# Patient Record
Sex: FEMALE | Race: WHITE | Employment: UNEMPLOYED | URBAN - METROPOLITAN AREA
[De-identification: names, ages, dates, MRNs, and addresses within clinical notes are randomized per-mention and may not be internally consistent; named-entity substitution may affect disease eponyms.]

---

## 2019-09-24 RX ORDER — BUPROPION HYDROCHLORIDE 200 MG/1
TABLET, EXTENDED RELEASE ORAL
Status: ON HOLD | COMMUNITY
Start: 2019-02-11 | End: 2020-08-11

## 2019-09-24 RX ORDER — NITROFURANTOIN 25; 75 MG/1; MG/1
CAPSULE ORAL
COMMUNITY
Start: 2019-04-02 | End: 2019-09-25 | Stop reason: ALTCHOICE

## 2020-08-11 ENCOUNTER — ANESTHESIA EVENT (OUTPATIENT)
Dept: LABOR AND DELIVERY | Age: 30
End: 2020-08-11
Payer: COMMERCIAL

## 2020-08-11 ENCOUNTER — ANESTHESIA (OUTPATIENT)
Dept: LABOR AND DELIVERY | Age: 30
End: 2020-08-11
Payer: COMMERCIAL

## 2020-08-11 ENCOUNTER — HOSPITAL ENCOUNTER (INPATIENT)
Age: 30
LOS: 2 days | Discharge: HOME | End: 2020-08-13
Attending: OBSTETRICS & GYNECOLOGY | Admitting: OBSTETRICS & GYNECOLOGY
Payer: COMMERCIAL

## 2020-08-11 LAB
ABO/RH: NORMAL
AMNISURE, POC: POSITIVE
AMPHETAMINE SCREEN, URINE: NOT DETECTED
ANTIBODY SCREEN: NORMAL
BARBITURATE SCREEN URINE: NOT DETECTED
BENZODIAZEPINE SCREEN, URINE: NOT DETECTED
CANNABINOID SCREEN URINE: NOT DETECTED
COCAINE METABOLITE SCREEN URINE: NOT DETECTED
FENTANYL SCREEN, URINE: NOT DETECTED
HCT VFR BLD CALC: 37.3 % (ref 34–48)
HEMOGLOBIN: 13.1 G/DL (ref 11.5–15.5)
Lab: ABNORMAL
Lab: NORMAL
MCH RBC QN AUTO: 32.6 PG (ref 26–35)
MCHC RBC AUTO-ENTMCNC: 35.1 % (ref 32–34.5)
MCV RBC AUTO: 92.8 FL (ref 80–99.9)
METHADONE SCREEN, URINE: NOT DETECTED
NEGATIVE QC PASS/FAIL: ABNORMAL
OPIATE SCREEN URINE: NOT DETECTED
OXYCODONE URINE: NOT DETECTED
PDW BLD-RTO: 11.9 FL (ref 11.5–15)
PHENCYCLIDINE SCREEN URINE: NOT DETECTED
PLATELET # BLD: 204 E9/L (ref 130–450)
PMV BLD AUTO: 10.6 FL (ref 7–12)
POSITIVE QC PASS/FAIL: ABNORMAL
RBC # BLD: 4.02 E12/L (ref 3.5–5.5)
WBC # BLD: 8.2 E9/L (ref 4.5–11.5)

## 2020-08-11 PROCEDURE — 6370000000 HC RX 637 (ALT 250 FOR IP): Performed by: OBSTETRICS & GYNECOLOGY

## 2020-08-11 PROCEDURE — 36415 COLL VENOUS BLD VENIPUNCTURE: CPT

## 2020-08-11 PROCEDURE — 2500000003 HC RX 250 WO HCPCS: Performed by: ANESTHESIOLOGY

## 2020-08-11 PROCEDURE — 51701 INSERT BLADDER CATHETER: CPT

## 2020-08-11 PROCEDURE — 85027 COMPLETE CBC AUTOMATED: CPT

## 2020-08-11 PROCEDURE — 86900 BLOOD TYPING SEROLOGIC ABO: CPT

## 2020-08-11 PROCEDURE — 6360000002 HC RX W HCPCS: Performed by: OBSTETRICS & GYNECOLOGY

## 2020-08-11 PROCEDURE — 86901 BLOOD TYPING SEROLOGIC RH(D): CPT

## 2020-08-11 PROCEDURE — 2500000003 HC RX 250 WO HCPCS: Performed by: NURSE ANESTHETIST, CERTIFIED REGISTERED

## 2020-08-11 PROCEDURE — 7200000001 HC VAGINAL DELIVERY

## 2020-08-11 PROCEDURE — 2580000003 HC RX 258: Performed by: MIDWIFE

## 2020-08-11 PROCEDURE — 0UQGXZZ REPAIR VAGINA, EXTERNAL APPROACH: ICD-10-PCS | Performed by: OBSTETRICS & GYNECOLOGY

## 2020-08-11 PROCEDURE — 6360000002 HC RX W HCPCS: Performed by: MIDWIFE

## 2020-08-11 PROCEDURE — 1220000000 HC SEMI PRIVATE OB R&B

## 2020-08-11 PROCEDURE — 86850 RBC ANTIBODY SCREEN: CPT

## 2020-08-11 PROCEDURE — 80307 DRUG TEST PRSMV CHEM ANLYZR: CPT

## 2020-08-11 PROCEDURE — 99233 SBSQ HOSP IP/OBS HIGH 50: CPT | Performed by: MIDWIFE

## 2020-08-11 PROCEDURE — 3700000025 EPIDURAL BLOCK: Performed by: ANESTHESIOLOGY

## 2020-08-11 RX ORDER — ONDANSETRON 2 MG/ML
4 INJECTION INTRAMUSCULAR; INTRAVENOUS EVERY 6 HOURS PRN
Status: DISCONTINUED | OUTPATIENT
Start: 2020-08-11 | End: 2020-08-11 | Stop reason: HOSPADM

## 2020-08-11 RX ORDER — NALBUPHINE HCL 10 MG/ML
5 AMPUL (ML) INJECTION EVERY 4 HOURS PRN
Status: DISCONTINUED | OUTPATIENT
Start: 2020-08-11 | End: 2020-08-11 | Stop reason: HOSPADM

## 2020-08-11 RX ORDER — FERROUS SULFATE 325(65) MG
325 TABLET ORAL 2 TIMES DAILY WITH MEALS
Status: DISCONTINUED | OUTPATIENT
Start: 2020-08-11 | End: 2020-08-13 | Stop reason: HOSPADM

## 2020-08-11 RX ORDER — ACETAMINOPHEN 650 MG
TABLET, EXTENDED RELEASE ORAL
Status: DISPENSED
Start: 2020-08-11 | End: 2020-08-11

## 2020-08-11 RX ORDER — SODIUM CHLORIDE 0.9 % (FLUSH) 0.9 %
10 SYRINGE (ML) INJECTION EVERY 12 HOURS SCHEDULED
Status: DISCONTINUED | OUTPATIENT
Start: 2020-08-11 | End: 2020-08-13 | Stop reason: HOSPADM

## 2020-08-11 RX ORDER — SODIUM CHLORIDE 0.9 % (FLUSH) 0.9 %
10 SYRINGE (ML) INJECTION PRN
Status: DISCONTINUED | OUTPATIENT
Start: 2020-08-11 | End: 2020-08-13 | Stop reason: HOSPADM

## 2020-08-11 RX ORDER — IBUPROFEN 800 MG/1
800 TABLET ORAL EVERY 8 HOURS PRN
Status: DISCONTINUED | OUTPATIENT
Start: 2020-08-11 | End: 2020-08-13 | Stop reason: HOSPADM

## 2020-08-11 RX ORDER — ACETAMINOPHEN 325 MG/1
650 TABLET ORAL EVERY 4 HOURS PRN
Status: DISCONTINUED | OUTPATIENT
Start: 2020-08-11 | End: 2020-08-13 | Stop reason: HOSPADM

## 2020-08-11 RX ORDER — SODIUM CHLORIDE, SODIUM LACTATE, POTASSIUM CHLORIDE, CALCIUM CHLORIDE 600; 310; 30; 20 MG/100ML; MG/100ML; MG/100ML; MG/100ML
INJECTION, SOLUTION INTRAVENOUS CONTINUOUS
Status: DISCONTINUED | OUTPATIENT
Start: 2020-08-11 | End: 2020-08-13 | Stop reason: HOSPADM

## 2020-08-11 RX ORDER — NALOXONE HYDROCHLORIDE 0.4 MG/ML
0.4 INJECTION, SOLUTION INTRAMUSCULAR; INTRAVENOUS; SUBCUTANEOUS PRN
Status: DISCONTINUED | OUTPATIENT
Start: 2020-08-11 | End: 2020-08-11 | Stop reason: HOSPADM

## 2020-08-11 RX ORDER — DOCUSATE SODIUM 100 MG/1
100 CAPSULE, LIQUID FILLED ORAL 2 TIMES DAILY
Status: DISCONTINUED | OUTPATIENT
Start: 2020-08-11 | End: 2020-08-13 | Stop reason: HOSPADM

## 2020-08-11 RX ORDER — LIDOCAINE HYDROCHLORIDE 10 MG/ML
INJECTION, SOLUTION INFILTRATION; PERINEURAL
Status: DISCONTINUED
Start: 2020-08-11 | End: 2020-08-11 | Stop reason: WASHOUT

## 2020-08-11 RX ORDER — MODIFIED LANOLIN
OINTMENT (GRAM) TOPICAL PRN
Status: DISCONTINUED | OUTPATIENT
Start: 2020-08-11 | End: 2020-08-13 | Stop reason: HOSPADM

## 2020-08-11 RX ADMIN — Medication 8 ML: at 05:58

## 2020-08-11 RX ADMIN — Medication 999 MILLI-UNITS/MIN: at 16:04

## 2020-08-11 RX ADMIN — Medication 1 MILLI-UNITS/MIN: at 13:34

## 2020-08-11 RX ADMIN — SODIUM CHLORIDE, POTASSIUM CHLORIDE, SODIUM LACTATE AND CALCIUM CHLORIDE: 600; 310; 30; 20 INJECTION, SOLUTION INTRAVENOUS at 06:39

## 2020-08-11 RX ADMIN — BENZOCAINE AND LEVOMENTHOL: 200; 5 SPRAY TOPICAL at 20:22

## 2020-08-11 RX ADMIN — DOCUSATE SODIUM 100 MG: 100 CAPSULE, LIQUID FILLED ORAL at 20:22

## 2020-08-11 RX ADMIN — Medication 15 ML/HR: at 05:59

## 2020-08-11 RX ADMIN — Medication: at 20:22

## 2020-08-11 RX ADMIN — IBUPROFEN 800 MG: 800 TABLET, FILM COATED ORAL at 20:22

## 2020-08-11 SDOH — HEALTH STABILITY: MENTAL HEALTH: HOW OFTEN DO YOU HAVE A DRINK CONTAINING ALCOHOL?: NEVER

## 2020-08-11 ASSESSMENT — PAIN SCALES - GENERAL: PAINLEVEL_OUTOF10: 3

## 2020-08-11 NOTE — PROCEDURES
Vaginal Delivery Note    Details of Procedure: The patient is a 31 y.o. female at 39w6d   OB History        1    Para        Term                AB        Living           SAB        TAB        Ectopic        Molar        Multiple        Live Births                 who was admitted for active phase labor. She received the following interventions: none She was known to be GBS negative and did not receive antibiotic prophylaxis. The patient progressed well,did receive an epidural, became complete and started to push. After pushing for 3 hours the fetal head was at the perineum, nose and mouth suctioned with bulb suction and the rest of the infant delivered atraumatically, placed on mother abdomen. Cord was clamped and cut and infant handed off to the waiting nurse for evaluation. The delivery of the placenta was spontaneous. The perineum and vagina were explored and a 1cm vaginal laceration was repaired.    Male Infant, Weight pending gms, APGARS 8 and 9    Anesthesia:  epidural anesthesia    Estimated blood loss:  300ml    Specimen:  Placenta not sent to pathology     Cord blood sent Yes    Complications:  none    Condition:  mother and infant stable in delivery room    Jayme Meckel, MD  OBGYN

## 2020-08-11 NOTE — ANESTHESIA PROCEDURE NOTES
Epidural Block    Patient location during procedure: OB  Reason for block: labor epidural  Staffing  Anesthesiologist: Nita Ferrer DO  Resident/CRNA: Gita Katz APRN - CRNA  Performed: resident/CRNA   Preanesthetic Checklist  Completed: patient identified, site marked, surgical consent, pre-op evaluation, timeout performed, IV checked, risks and benefits discussed, monitors and equipment checked, anesthesia consent given, oxygen available and patient being monitored  Epidural  Patient position: sitting  Prep: ChloraPrep  Patient monitoring: cardiac monitor, continuous pulse ox and frequent blood pressure checks  Approach: midline  Location: lumbar (1-5)  Injection technique: KARRI saline  Provider prep: mask and sterile gloves  Needle  Needle type: Tuohy   Needle gauge: 18 G  Needle length: 3.5 in  Needle insertion depth: 6 cm  Catheter type: end hole  Catheter size: 20 G.   Catheter at skin depth: 14 cm  Test dose: negative  Assessment  Hemodynamics: stable  Attempts: 1

## 2020-08-11 NOTE — ANESTHESIA PRE PROCEDURE
Department of Anesthesiology  Preprocedure Note       Name:  Lauren Monreal   Age:  31 y.o.  :  1990                                          MRN:  [id number]         Date:  2020      Surgeon: * No surgeons listed *    Procedure: * No procedures listed *    Medications prior to admission:   Prior to Admission medications    Medication Sig Start Date End Date Taking? Authorizing Provider   Prenatal Vit-Fe Fumarate-FA (PRENATAL 19 PO) Take by mouth   Yes Historical Provider, MD       Current medications:    Current Facility-Administered Medications   Medication Dose Route Frequency Provider Last Rate Last Dose    povidone-iodine (BETADINE) 10 % external solution             lidocaine 1 % injection             lactated ringers infusion   Intravenous Continuous You Medal, APRN - CNM        oxytocin (PITOCIN) 30 units in 500 mL infusion  1 stephanie-units/min Intravenous Continuous PRN You Medal, APRN - CNM        naloxone [facility]) injection 0.4 mg  0.4 mg Intravenous PRN Elder Isabella, DO        nalbuphine (NUBAIN) injection 5 mg  5 mg Intravenous Q4H PRN Elder Isabella, DO        ondansetron [facility]) injection 4 mg  4 mg Intravenous Q6H PRN Elder Isabella, DO        fentaNYL 1.85mcg/ml and Bupivicaine 0.1% in 0.9% NS 135ml infusion (OB) epidural  15 mL/hr Epidural Continuous Elder Isabella, DO           Allergies: Allergies   Allergen Reactions    Loracarbef        Problem List:    Patient Active Problem List   Diagnosis Code    Anxiety F41.9    Uterine contractions during pregnancy O62.2       Past Medical History:        Diagnosis Date    Anxiety        Past Surgical History:  History reviewed. No pertinent surgical history.     Social History:    Social History     Tobacco Use    Smoking status: Never Smoker    Smokeless tobacco: Never Used   Substance Use Topics    Alcohol use: Never     Frequency: Never                                Counseling given: Not Answered      Vital Signs (Current):   Vitals:    08/11/20 0441 08/11/20 0445   BP: (!) 140/91 (!) 140/91   Pulse: 83 83   Resp: 16    Temp: 36.9 °C (98.4 °F)    TempSrc: Oral    Weight: 150 lb (68 kg)    Height: 5' 4\" (1.626 m)                                               BP Readings from Last 3 Encounters:   08/11/20 (!) 140/91       NPO Status: Time of last liquid consumption: 2330                        Time of last solid consumption: 2330                        Date of last liquid consumption: 08/10/20                        Date of last solid food consumption: 08/10/20    BMI:   Wt Readings from Last 3 Encounters:   08/11/20 150 lb (68 kg)     Body mass index is 25.75 kg/m². CBC:   Lab Results   Component Value Date    WBC 8.2 08/11/2020    RBC 4.02 08/11/2020    HGB 13.1 08/11/2020    HCT 37.3 08/11/2020    MCV 92.8 08/11/2020    RDW 11.9 08/11/2020     08/11/2020       CMP: No results found for: NA, K, CL, CO2, BUN, CREATININE, GFRAA, AGRATIO, LABGLOM, GLUCOSE, PROT, CALCIUM, BILITOT, ALKPHOS, AST, ALT    POC Tests: No results for input(s): POCGLU, POCNA, POCK, POCCL, POCBUN, POCHEMO, POCHCT in the last 72 hours.     Coags: No results found for: PROTIME, INR, APTT    HCG (If Applicable): No results found for: PREGTESTUR, PREGSERUM, HCG, HCGQUANT     ABGs: No results found for: PHART, PO2ART, RSU6HKH, MME3FJR, BEART, G3GZMTYP     Type & Screen (If Applicable):  No results found for: LABABO, LABRH    Drug/Infectious Status (If Applicable):  No results found for: HIV, HEPCAB    COVID-19 Screening (If Applicable): No results found for: COVID19      Anesthesia Evaluation   no history of anesthetic complications:   Airway: Mallampati: II  TM distance: >3 FB   Neck ROM: full  Mouth opening: > = 3 FB Dental: normal exam         Pulmonary:Negative Pulmonary ROS and normal exam  breath sounds clear to auscultation                             Cardiovascular:Negative CV ROS            Rhythm: regular  Rate: normal                    Neuro/Psych:   (+) depression/anxiety             GI/Hepatic/Renal: Neg GI/Hepatic/Renal ROS            Endo/Other: Negative Endo/Other ROS                    Abdominal:           Vascular: negative vascular ROS. Anesthesia Plan      epidural     ASA 2             Anesthetic plan and risks discussed with patient.                       Jay Antonio APRN - CRNA   8/11/2020

## 2020-08-11 NOTE — FLOWSHEET NOTE
Patient helped to bathroom. Unable to void. Nina care given and explained. Ice cont. To perineum. Assisted back to bed. Tolerated ambulating well.

## 2020-08-11 NOTE — H&P
Department of Obstetrics and Gynecology  Nurse Practitioner Obstetrics History and Physical        CHIEF COMPLAINT:  contractions    HISTORY OF PRESENT ILLNESS:  Dez Shine is a 31 y.o. female , No LMP recorded. Patient is pregnant. ,  at Unknown. Presents to L&D with contractions that started around 5pm last evening getting stronger and closer. Had sudden gush of clear fluid around 4am.  Denies bleeding, reports good FM. Pregnancy uncomplicated. GBS negative      OB History        1    Para        Term                AB        Living           SAB        TAB        Ectopic        Molar        Multiple        Live Births                    Estimated Due Date: Estimated Date of Delivery: None noted. Pregnancy complicated by:   Patient Active Problem List   Diagnosis Code    Anxiety F41.9    Uterine contractions during pregnancy O62.2           PAST OB HISTORY  OB History        1    Para        Term                AB        Living           SAB        TAB        Ectopic        Molar        Multiple        Live Births                      Past Medical History:          Diagnosis Date    Anxiety        Past Surgical History:      History reviewed. No pertinent surgical history. Social History:    TOBACCO:   reports that she has never smoked. She has never used smokeless tobacco.  ETOH:   reports no history of alcohol use. DRUGS:   reports no history of drug use.   Family History:       Problem Relation Age of Onset    Anxiety Disorder Mother         Endometriosis    Coronary Art Dis Father     Hypertension Brother     High Cholesterol Brother        Medications Prior to Admission:  Medications Prior to Admission: Prenatal Vit-Fe Fumarate-FA (PRENATAL 19 PO), Take by mouth  [DISCONTINUED] Norgestim-Eth Estrad Triphasic 0.18/0.215/0.25 MG-35 MCG TABS, Take 1 tablet by mouth daily  [DISCONTINUED] buPROPion (WELLBUTRIN SR) 200 MG extended release tablet, Take by mouth    Allergies:  Loracarbef      REVIEW OF SYSTEMS:          CONSTITUTIONAL :      No fever, no chills   HEENT :                         Headache absent,   visual disturbances absent  CARDIOVASCULAR :    No chest pain, no palpitations, no edema   RESPIRATORY :            No pain, no shortness of breath   GASTROINTESTINAL : No N/V, no D/C,    abdominal pain absent   GENITOURINARY :      Dysuria   absent,   hematuria absent,   urinary frequency absent  MUSCULOSKELETAL:  No myalgia,   back pain absent  NEUROLOGICAL :        No migraine, no seizures. Pertinent positives and negatives addressed in HPI, other systems reviewed and negative      PHYSICAL EXAM:    BP (!) 140/91   Pulse 83   Temp 98.4 °F (36.9 °C) (Oral)   Resp 16   Ht 5' 4\" (1.626 m)   Wt 150 lb (68 kg)   BMI 25.75 kg/m²     General appearance:  awake, alert, cooperative, no apparent distress, and appears stated age  Neurologic:  Awake, alert, oriented to name, place and time.   Ambulatory to unit      Lungs:  Respirations easy and unlabored    Abdomen:   soft, gravid, non-tender,   CVA tenderness NA   Fetal position vertex by Leopold's   EFW AGA  Fetal heart rate:  Baseline Heart Rate 135   Variability moderate   Accelerations Present   Decelerations absent  Pelvis:  External Genitalia: Lesions absent  SSE:  NA  Cervix:    DILATION:  5 cm  EFFACEMENT:  100%  STATION:  -2    Contractions:  regular, every 1-2 minutes  Membranes:  Date/time: 2020 at 0400, Amount: gush and Color: clear      GBS: negative      Impression:  31 y.o.  at 40.5 weeks gestation, active labor, GBS negative, category I tracing    Discussed with  Dr. Walker Murphy     Plan:  Admit, routine labor orders, may have epidural.          Electronically signed by SANDIE Fox CNM on 2020 at 5:22 AM

## 2020-08-11 NOTE — PROGRESS NOTES
Dr. Lauren Chapa notified patient still pushing at this time.  Doctor would like patient to go in knee chest for 30 minutes and pitocin to be started

## 2020-08-12 LAB
HCT VFR BLD CALC: 29.5 % (ref 34–48)
HEMOGLOBIN: 10.3 G/DL (ref 11.5–15.5)

## 2020-08-12 PROCEDURE — 85018 HEMOGLOBIN: CPT

## 2020-08-12 PROCEDURE — 51701 INSERT BLADDER CATHETER: CPT

## 2020-08-12 PROCEDURE — 36415 COLL VENOUS BLD VENIPUNCTURE: CPT

## 2020-08-12 PROCEDURE — 1220000000 HC SEMI PRIVATE OB R&B

## 2020-08-12 PROCEDURE — 6370000000 HC RX 637 (ALT 250 FOR IP): Performed by: OBSTETRICS & GYNECOLOGY

## 2020-08-12 PROCEDURE — 85014 HEMATOCRIT: CPT

## 2020-08-12 PROCEDURE — 51798 US URINE CAPACITY MEASURE: CPT

## 2020-08-12 RX ADMIN — IBUPROFEN 800 MG: 800 TABLET, FILM COATED ORAL at 19:59

## 2020-08-12 RX ADMIN — DOCUSATE SODIUM 100 MG: 100 CAPSULE, LIQUID FILLED ORAL at 19:59

## 2020-08-12 RX ADMIN — DOCUSATE SODIUM 100 MG: 100 CAPSULE, LIQUID FILLED ORAL at 10:34

## 2020-08-12 RX ADMIN — IBUPROFEN 800 MG: 800 TABLET, FILM COATED ORAL at 04:35

## 2020-08-12 RX ADMIN — Medication: at 10:34

## 2020-08-12 ASSESSMENT — PAIN SCALES - GENERAL
PAINLEVEL_OUTOF10: 3
PAINLEVEL_OUTOF10: 1

## 2020-08-12 NOTE — PROGRESS NOTES
Darden catheter discontinued without difficulty. DTV [id number]. Vaginal bleeding small amount. Aware of molly care. Not wanting to use ice.

## 2020-08-12 NOTE — ANESTHESIA POSTPROCEDURE EVALUATION
Department of Anesthesiology  Postprocedure Note    Patient: Dez Shine  MRN: [id number]  YOB: 1990  Date of evaluation: 8/12/2020  Time:  8:50 AM     Procedure Summary     Date:  08/11/20 Room / Location:      Anesthesia Start:  0541 Anesthesia Stop:  0532    Procedure:  Labor Analgesia Diagnosis:      Scheduled Providers:   Responsible Provider:  Breanna Vides DO    Anesthesia Type:  epidural ASA Status:  2          Anesthesia Type: epidural    Joycelyn Phase I:      Joycelyn Phase II:      Last vitals: Reviewed and per EMR flowsheets.        Anesthesia Post Evaluation    Patient location during evaluation: bedside  Patient participation: complete - patient participated  Level of consciousness: awake and alert  Pain score: 0  Airway patency: patent  Nausea & Vomiting: no nausea and no vomiting  Complications: no  Cardiovascular status: hemodynamically stable  Respiratory status: acceptable and room air  Hydration status: euvolemic

## 2020-08-12 NOTE — PROGRESS NOTES
Post Partum Vaginal Delivery Progress Note    PPD# 1 s/p     SUBJECTIVE:  No complaints. Couldn't urinate after delivery - valiente placed this morning.       Vitals:  Vitals:    20 1919 20 2324 20 0624 20 0711   BP: (!) 140/81 131/75 122/79 127/81   Pulse: 79 84 69 80   Resp: 18 18 16 16   Temp: 98.5 °F (36.9 °C) 97.4 °F (36.3 °C) 98.2 °F (36.8 °C) 98.6 °F (37 °C)   TempSrc: Oral Oral Oral Oral   SpO2:       Weight:       Height:           Exam:  Fundus firm, non-tender, normal lochia  Extremities non-tender    Labs:   Lab Results   Component Value Date    WBC 8.2 2020    HGB 10.3 (L) 2020    HCT 29.5 (L) 2020    MCV 92.8 2020     2020       ASSESSMENT & PLAN:  PPD # 1  Patient Active Problem List   Diagnosis    Anxiety    Uterine contractions during pregnancy     -Continue routine postpartum care  -D/c valiente this afternoon to see if pt can void  -Postpartum Hgb 10.3  -Cont inpt management     Trevor Curtis MD  OBGYN

## 2020-08-12 NOTE — PROGRESS NOTES
Patient up to bathroom with assistance. Unable to void. Patient straight cathed 1450ml. Patient tolerated well. Bleeding small amount. fundas firm midline U-U. DTV [id number].

## 2020-08-12 NOTE — PROGRESS NOTES
Hearing screening results were discussed with parent. Questions answered. Brochure given to parent. Advised to monitor developmental milestones and contact physician for any concerns.    Wendy Ellis

## 2020-08-12 NOTE — LACTATION NOTE
Mom reports baby has latched well a couple times, sleepy now skin to skin. Encouraged skin to skin and frequent attempts at breast to stimulate milk production. Instructed on normal infant behavior in the first 12-24 hours and importance of stimulating the baby frequently to eat during this time. Reviewed hand expression, and encouraged to hand express drops of colostrum when baby is sleepy. Instructed that baby may also feed 8-12 times a day- cluster feeding at times- as her milk supply is being established. Instructed on benefits of skin to skin, rooming-in and avoidance of pacifier use until breastfeeding is well established. Educated on making sure infant has an open airway while breastfeeding and skin to skin. Instructed on hunger cues and waking techniques to try. Reviewed signs of adequate I & O; allow baby to feed ad temo and not to limit time at breast. Information given regarding health benefits of colostrum and exclusive breastfeeding. Encouraged to call with any concerns. Mom has a breast pump for home use. Lactation resources provided.

## 2020-08-13 VITALS
BODY MASS INDEX: 25.61 KG/M2 | DIASTOLIC BLOOD PRESSURE: 77 MMHG | HEART RATE: 69 BPM | OXYGEN SATURATION: 100 % | RESPIRATION RATE: 16 BRPM | SYSTOLIC BLOOD PRESSURE: 115 MMHG | WEIGHT: 150 LBS | TEMPERATURE: 97.6 F | HEIGHT: 64 IN

## 2020-08-13 PROCEDURE — 6370000000 HC RX 637 (ALT 250 FOR IP): Performed by: OBSTETRICS & GYNECOLOGY

## 2020-08-13 RX ORDER — NITROFURANTOIN 25; 75 MG/1; MG/1
100 CAPSULE ORAL DAILY
Qty: 10 CAPSULE | Refills: 0 | Status: SHIPPED | OUTPATIENT
Start: 2020-08-13 | End: 2020-08-23

## 2020-08-13 RX ORDER — IBUPROFEN 800 MG/1
800 TABLET ORAL EVERY 8 HOURS PRN
Qty: 24 TABLET | Refills: 0 | Status: SHIPPED | OUTPATIENT
Start: 2020-08-13 | End: 2022-03-07

## 2020-08-13 RX ADMIN — Medication: at 09:41

## 2020-08-13 RX ADMIN — IBUPROFEN 800 MG: 800 TABLET, FILM COATED ORAL at 09:41

## 2020-08-13 RX ADMIN — DOCUSATE SODIUM 100 MG: 100 CAPSULE, LIQUID FILLED ORAL at 09:40

## 2020-08-13 ASSESSMENT — PAIN DESCRIPTION - FREQUENCY
FREQUENCY: INTERMITTENT
FREQUENCY: INTERMITTENT

## 2020-08-13 ASSESSMENT — PAIN SCALES - GENERAL
PAINLEVEL_OUTOF10: 1
PAINLEVEL_OUTOF10: 3

## 2020-08-13 ASSESSMENT — PAIN - FUNCTIONAL ASSESSMENT
PAIN_FUNCTIONAL_ASSESSMENT: ACTIVITIES ARE NOT PREVENTED
PAIN_FUNCTIONAL_ASSESSMENT: ACTIVITIES ARE NOT PREVENTED

## 2020-08-13 ASSESSMENT — PAIN DESCRIPTION - ORIENTATION
ORIENTATION: LOWER
ORIENTATION: LOWER

## 2020-08-13 ASSESSMENT — PAIN DESCRIPTION - DESCRIPTORS
DESCRIPTORS: CRAMPING
DESCRIPTORS: CRAMPING

## 2020-08-13 ASSESSMENT — PAIN DESCRIPTION - LOCATION
LOCATION: ABDOMEN
LOCATION: ABDOMEN

## 2020-08-13 ASSESSMENT — PAIN DESCRIPTION - PROGRESSION
CLINICAL_PROGRESSION: GRADUALLY IMPROVING
CLINICAL_PROGRESSION: NOT CHANGED

## 2020-08-13 ASSESSMENT — PAIN DESCRIPTION - PAIN TYPE
TYPE: ACUTE PAIN
TYPE: ACUTE PAIN

## 2020-08-13 NOTE — PROGRESS NOTES
Discharge teaching done. Instructions given. Mom verbalizes understanding. TN'Z filled and delivered by in house pharmacy. Mom ready for discharge.

## 2020-08-13 NOTE — DISCHARGE SUMMARY
Obstetrical Discharge Form    Gestational Age:40w5d    Antepartum complications: none    Date of Delivery: 2020     Type of Delivery: vaginal, spontaneous    Delivered By:  Elsy Hatfield MD         Baby:   Information for the patient's :  Trupti,  [facility] [[id number]]        Anesthesia: Epidural    Intrapartum complications: None      Postpartum complications: inability for spontaneous urination    Discharge Date: 2020    Condition at Discharge: stable  Disposition: home    Plan:   Follow up in 4 day(s)

## 2020-08-13 NOTE — PROGRESS NOTES
Post Partum Vaginal Delivery Progress Note    PPD# 2 s/p     SUBJECTIVE:  Still couldn't urinate overnight therefore catheter was placed again.        Vitals:  Vitals:    20 0711 20 1222 20 1946 20 0857   BP: 127/81 125/80 133/89 115/77   Pulse: 80 82 88 69   Resp: 16 18 16 16   Temp: 98.6 °F (37 °C) 98.7 °F (37.1 °C) 97.8 °F (36.6 °C) 97.6 °F (36.4 °C)   TempSrc: Oral Oral Oral Oral   SpO2:       Weight:       Height:           Exam:  Fundus firm, non-tender, normal lochia  Extremities non-tender    Labs:   Lab Results   Component Value Date    WBC 8.2 2020    HGB 10.3 (L) 2020    HCT 29.5 (L) 2020    MCV 92.8 2020     2020       ASSESSMENT & PLAN:  PPD # 2  Patient Active Problem List   Diagnosis    Anxiety    Uterine contractions during pregnancy     -Continue routine postpartum care  -Postpartum Hgb 10.3  -Will d/c home today with follow up Monday to remove catheter  -Home with valiente catheter, will RX ABX PPX  -Reviewed instructions with pt    MD CATHY Cheatham

## 2020-08-13 NOTE — LACTATION NOTE
Pt states that baby has latched well but has trouble at other times. Baby is currently being circumcised. Encouraged to call for assistance with next feeding.

## 2020-08-13 NOTE — PROGRESS NOTES
Pt only able to void small amounts. Bladder scan was >400ml. Dr Lora Olmos notified order to straight cath after each void until residual is less than 50ml.

## 2022-03-07 DIAGNOSIS — Z34.81 MULTIGRAVIDA IN FIRST TRIMESTER: ICD-10-CM

## 2022-03-07 LAB
BACTERIA: ABNORMAL /HPF
BILIRUBIN URINE: NEGATIVE
BLOOD, URINE: NEGATIVE
CLARITY: ABNORMAL
COLOR: YELLOW
EPITHELIAL CELLS, UA: ABNORMAL /HPF
GLUCOSE URINE: NEGATIVE MG/DL
HCT VFR BLD CALC: 36.9 % (ref 34–48)
HEMOGLOBIN: 12.5 G/DL (ref 11.5–15.5)
KETONES, URINE: NEGATIVE MG/DL
LEUKOCYTE ESTERASE, URINE: NEGATIVE
MCH RBC QN AUTO: 31.4 PG (ref 26–35)
MCHC RBC AUTO-ENTMCNC: 33.9 % (ref 32–34.5)
MCV RBC AUTO: 92.7 FL (ref 80–99.9)
NITRITE, URINE: NEGATIVE
PDW BLD-RTO: 11.9 FL (ref 11.5–15)
PH UA: 6 (ref 5–9)
PLATELET # BLD: 254 E9/L (ref 130–450)
PMV BLD AUTO: 9.7 FL (ref 7–12)
PROTEIN UA: NEGATIVE MG/DL
RBC # BLD: 3.98 E12/L (ref 3.5–5.5)
RBC UA: ABNORMAL /HPF (ref 0–2)
SPECIFIC GRAVITY UA: >=1.03 (ref 1–1.03)
TSH SERPL DL<=0.05 MIU/L-ACNC: 1.21 UIU/ML (ref 0.27–4.2)
UROBILINOGEN, URINE: 0.2 E.U./DL
WBC # BLD: 8.2 E9/L (ref 4.5–11.5)
WBC UA: ABNORMAL /HPF (ref 0–5)
YEAST: PRESENT /HPF

## 2022-03-08 LAB
ABO/RH: NORMAL
ANTIBODY SCREEN: NORMAL
HBA1C MFR BLD: 4.7 % (ref 4–5.6)
HEPATITIS B SURFACE ANTIGEN INTERPRETATION: NORMAL
HEPATITIS C ANTIBODY INTERPRETATION: NORMAL
HIV-1 AND HIV-2 ANTIBODIES: NORMAL
RPR: NORMAL
RUBELLA ANTIBODY IGG: NORMAL
VARICELLA-ZOSTER VIRUS AB, IGG: NORMAL

## 2022-03-09 LAB
TOXOPLASMA IGM ANTIBODY: NORMAL
TOXOPLASMOSIS IGG AB: NORMAL
URINE CULTURE, ROUTINE: NORMAL

## 2022-07-22 ENCOUNTER — HOSPITAL ENCOUNTER (OUTPATIENT)
Dept: INFUSION THERAPY | Age: 32
Setting detail: INFUSION SERIES
Discharge: HOME | End: 2022-07-22
Payer: COMMERCIAL

## 2022-07-22 VITALS
HEIGHT: 64 IN | BODY MASS INDEX: 25.1 KG/M2 | HEART RATE: 96 BPM | SYSTOLIC BLOOD PRESSURE: 116 MMHG | OXYGEN SATURATION: 98 % | WEIGHT: 147 LBS | DIASTOLIC BLOOD PRESSURE: 65 MMHG | TEMPERATURE: 99 F | RESPIRATION RATE: 18 BRPM

## 2022-07-22 PROCEDURE — 96372 THER/PROPH/DIAG INJ SC/IM: CPT

## 2022-07-22 PROCEDURE — 6360000002 HC RX W HCPCS: Performed by: OBSTETRICS & GYNECOLOGY

## 2022-07-22 RX ADMIN — HUMAN RHO(D) IMMUNE GLOBULIN 300 MCG: 300 INJECTION, SOLUTION INTRAMUSCULAR at 13:52

## 2022-07-22 NOTE — PROGRESS NOTES
Pt states that Dr Tyrese De La Fuente informed her of the need for her Rhogam shot.  Pt with no voiced concerns or complaints

## 2022-09-07 PROBLEM — R89.8: Status: ACTIVE | Noted: 2022-09-07

## 2022-09-07 PROBLEM — Z13.79 GENETIC TESTING: Status: ACTIVE | Noted: 2022-09-07

## 2022-09-07 PROBLEM — Z67.91 RH NEGATIVE STATE IN ANTEPARTUM PERIOD: Status: ACTIVE | Noted: 2022-09-07

## 2022-09-07 PROBLEM — Z34.83 MULTIGRAVIDA IN THIRD TRIMESTER: Status: ACTIVE | Noted: 2022-09-07

## 2022-09-07 PROBLEM — O26.899 RH NEGATIVE STATE IN ANTEPARTUM PERIOD: Status: ACTIVE | Noted: 2022-09-07

## 2022-10-07 PROBLEM — Z13.79 GENETIC TESTING: Status: RESOLVED | Noted: 2022-09-07 | Resolved: 2022-10-07

## 2022-10-11 ENCOUNTER — ANESTHESIA (OUTPATIENT)
Dept: LABOR AND DELIVERY | Age: 32
End: 2022-10-11
Payer: COMMERCIAL

## 2022-10-11 ENCOUNTER — ANESTHESIA EVENT (OUTPATIENT)
Dept: LABOR AND DELIVERY | Age: 32
End: 2022-10-11
Payer: COMMERCIAL

## 2022-10-11 ENCOUNTER — HOSPITAL ENCOUNTER (INPATIENT)
Age: 32
LOS: 1 days | Discharge: HOME | End: 2022-10-12
Attending: OBSTETRICS & GYNECOLOGY | Admitting: OBSTETRICS & GYNECOLOGY
Payer: COMMERCIAL

## 2022-10-11 PROBLEM — Z3A.39 39 WEEKS GESTATION OF PREGNANCY: Status: ACTIVE | Noted: 2022-10-11

## 2022-10-11 PROBLEM — O48.0 41 WEEKS GESTATION OF PREGNANCY: Status: ACTIVE | Noted: 2022-10-11

## 2022-10-11 PROBLEM — O48.0 POST-TERM PREGNANCY, 40-42 WEEKS OF GESTATION: Status: ACTIVE | Noted: 2022-10-11

## 2022-10-11 PROBLEM — Z3A.41 41 WEEKS GESTATION OF PREGNANCY: Status: ACTIVE | Noted: 2022-10-11

## 2022-10-11 LAB
ABO/RH: NORMAL
AMPHETAMINE SCREEN, URINE: NOT DETECTED
ANTIBODY SCREEN: NORMAL
BARBITURATE SCREEN URINE: NOT DETECTED
BENZODIAZEPINE SCREEN, URINE: NOT DETECTED
CANNABINOID SCREEN URINE: NOT DETECTED
COCAINE METABOLITE SCREEN URINE: NOT DETECTED
FENTANYL SCREEN, URINE: NOT DETECTED
HCT VFR BLD CALC: 39.8 % (ref 34–48)
HEMOGLOBIN: 13.5 G/DL (ref 11.5–15.5)
Lab: NORMAL
MCH RBC QN AUTO: 32.1 PG (ref 26–35)
MCHC RBC AUTO-ENTMCNC: 33.9 % (ref 32–34.5)
MCV RBC AUTO: 94.5 FL (ref 80–99.9)
METHADONE SCREEN, URINE: NOT DETECTED
OPIATE SCREEN URINE: NOT DETECTED
OXYCODONE URINE: NOT DETECTED
PDW BLD-RTO: 12.1 FL (ref 11.5–15)
PHENCYCLIDINE SCREEN URINE: NOT DETECTED
PLATELET # BLD: 183 E9/L (ref 130–450)
PMV BLD AUTO: 11.1 FL (ref 7–12)
RBC # BLD: 4.21 E12/L (ref 3.5–5.5)
WBC # BLD: 11.2 E9/L (ref 4.5–11.5)

## 2022-10-11 PROCEDURE — 2580000003 HC RX 258: Performed by: OBSTETRICS & GYNECOLOGY

## 2022-10-11 PROCEDURE — 6360000002 HC RX W HCPCS: Performed by: OBSTETRICS & GYNECOLOGY

## 2022-10-11 PROCEDURE — 7200000001 HC VAGINAL DELIVERY

## 2022-10-11 PROCEDURE — 86901 BLOOD TYPING SEROLOGIC RH(D): CPT

## 2022-10-11 PROCEDURE — 3700000025 EPIDURAL BLOCK: Performed by: ANESTHESIOLOGY

## 2022-10-11 PROCEDURE — 10907ZC DRAINAGE OF AMNIOTIC FLUID, THERAPEUTIC FROM PRODUCTS OF CONCEPTION, VIA NATURAL OR ARTIFICIAL OPENING: ICD-10-PCS | Performed by: OBSTETRICS & GYNECOLOGY

## 2022-10-11 PROCEDURE — 80307 DRUG TEST PRSMV CHEM ANLYZR: CPT

## 2022-10-11 PROCEDURE — 51701 INSERT BLADDER CATHETER: CPT

## 2022-10-11 PROCEDURE — 2500000003 HC RX 250 WO HCPCS

## 2022-10-11 PROCEDURE — 86900 BLOOD TYPING SEROLOGIC ABO: CPT

## 2022-10-11 PROCEDURE — 6360000002 HC RX W HCPCS: Performed by: ANESTHESIOLOGY

## 2022-10-11 PROCEDURE — 2500000003 HC RX 250 WO HCPCS: Performed by: ANESTHESIOLOGY

## 2022-10-11 PROCEDURE — 1220000000 HC SEMI PRIVATE OB R&B

## 2022-10-11 PROCEDURE — 86850 RBC ANTIBODY SCREEN: CPT

## 2022-10-11 PROCEDURE — 36415 COLL VENOUS BLD VENIPUNCTURE: CPT

## 2022-10-11 PROCEDURE — 99221 1ST HOSP IP/OBS SF/LOW 40: CPT | Performed by: PHYSICIAN ASSISTANT

## 2022-10-11 PROCEDURE — 6370000000 HC RX 637 (ALT 250 FOR IP): Performed by: OBSTETRICS & GYNECOLOGY

## 2022-10-11 PROCEDURE — 85027 COMPLETE CBC AUTOMATED: CPT

## 2022-10-11 RX ORDER — ACETAMINOPHEN 160 MG/5ML
1000 SOLUTION ORAL EVERY 8 HOURS PRN
Status: DISCONTINUED | OUTPATIENT
Start: 2022-10-11 | End: 2022-10-12 | Stop reason: HOSPADM

## 2022-10-11 RX ORDER — PRENATAL WITH FERROUS FUM AND FOLIC ACID 3080; 920; 120; 400; 22; 1.84; 3; 20; 10; 1; 12; 200; 27; 25; 2 [IU]/1; [IU]/1; MG/1; [IU]/1; MG/1; MG/1; MG/1; MG/1; MG/1; MG/1; UG/1; MG/1; MG/1; MG/1; MG/1
1 TABLET ORAL
Status: DISCONTINUED | OUTPATIENT
Start: 2022-10-12 | End: 2022-10-12 | Stop reason: HOSPADM

## 2022-10-11 RX ORDER — NALOXONE HYDROCHLORIDE 0.4 MG/ML
INJECTION, SOLUTION INTRAMUSCULAR; INTRAVENOUS; SUBCUTANEOUS PRN
Status: DISCONTINUED | OUTPATIENT
Start: 2022-10-11 | End: 2022-10-11

## 2022-10-11 RX ORDER — SODIUM CHLORIDE 0.9 % (FLUSH) 0.9 %
5-40 SYRINGE (ML) INJECTION EVERY 12 HOURS SCHEDULED
Status: DISCONTINUED | OUTPATIENT
Start: 2022-10-11 | End: 2022-10-11

## 2022-10-11 RX ORDER — SODIUM CHLORIDE, SODIUM LACTATE, POTASSIUM CHLORIDE, AND CALCIUM CHLORIDE .6; .31; .03; .02 G/100ML; G/100ML; G/100ML; G/100ML
1000 INJECTION, SOLUTION INTRAVENOUS PRN
Status: DISCONTINUED | OUTPATIENT
Start: 2022-10-11 | End: 2022-10-11

## 2022-10-11 RX ORDER — BUPIVACAINE HYDROCHLORIDE 2.5 MG/ML
INJECTION, SOLUTION EPIDURAL; INFILTRATION; INTRACAUDAL PRN
Status: DISCONTINUED | OUTPATIENT
Start: 2022-10-11 | End: 2022-10-11 | Stop reason: SDUPTHER

## 2022-10-11 RX ORDER — MODIFIED LANOLIN
OINTMENT (GRAM) TOPICAL PRN
Status: DISCONTINUED | OUTPATIENT
Start: 2022-10-11 | End: 2022-10-12 | Stop reason: HOSPADM

## 2022-10-11 RX ORDER — DOCUSATE SODIUM 100 MG/1
100 CAPSULE, LIQUID FILLED ORAL 2 TIMES DAILY
Status: DISCONTINUED | OUTPATIENT
Start: 2022-10-11 | End: 2022-10-12 | Stop reason: HOSPADM

## 2022-10-11 RX ORDER — METHYLERGONOVINE MALEATE 0.2 MG/ML
200 INJECTION INTRAVENOUS PRN
Status: DISCONTINUED | OUTPATIENT
Start: 2022-10-11 | End: 2022-10-11

## 2022-10-11 RX ORDER — SODIUM CHLORIDE, SODIUM LACTATE, POTASSIUM CHLORIDE, AND CALCIUM CHLORIDE .6; .31; .03; .02 G/100ML; G/100ML; G/100ML; G/100ML
500 INJECTION, SOLUTION INTRAVENOUS PRN
Status: DISCONTINUED | OUTPATIENT
Start: 2022-10-11 | End: 2022-10-11

## 2022-10-11 RX ORDER — CARBOPROST TROMETHAMINE 250 UG/ML
250 INJECTION, SOLUTION INTRAMUSCULAR PRN
Status: DISCONTINUED | OUTPATIENT
Start: 2022-10-11 | End: 2022-10-11

## 2022-10-11 RX ORDER — SODIUM CHLORIDE 0.9 % (FLUSH) 0.9 %
5-40 SYRINGE (ML) INJECTION PRN
Status: DISCONTINUED | OUTPATIENT
Start: 2022-10-11 | End: 2022-10-11

## 2022-10-11 RX ORDER — ONDANSETRON 2 MG/ML
4 INJECTION INTRAMUSCULAR; INTRAVENOUS EVERY 6 HOURS PRN
Status: DISCONTINUED | OUTPATIENT
Start: 2022-10-11 | End: 2022-10-11

## 2022-10-11 RX ORDER — DOCUSATE SODIUM 100 MG/1
100 CAPSULE, LIQUID FILLED ORAL 2 TIMES DAILY
Status: DISCONTINUED | OUTPATIENT
Start: 2022-10-11 | End: 2022-10-11

## 2022-10-11 RX ORDER — MISOPROSTOL 200 UG/1
800 TABLET ORAL PRN
Status: DISCONTINUED | OUTPATIENT
Start: 2022-10-11 | End: 2022-10-11

## 2022-10-11 RX ORDER — LIDOCAINE HYDROCHLORIDE 10 MG/ML
INJECTION, SOLUTION INFILTRATION; PERINEURAL
Status: DISCONTINUED
Start: 2022-10-11 | End: 2022-10-11 | Stop reason: WASHOUT

## 2022-10-11 RX ORDER — SODIUM CHLORIDE 9 MG/ML
25 INJECTION, SOLUTION INTRAVENOUS PRN
Status: DISCONTINUED | OUTPATIENT
Start: 2022-10-11 | End: 2022-10-11

## 2022-10-11 RX ORDER — LIDOCAINE HYDROCHLORIDE AND EPINEPHRINE 15; 5 MG/ML; UG/ML
INJECTION, SOLUTION EPIDURAL PRN
Status: DISCONTINUED | OUTPATIENT
Start: 2022-10-11 | End: 2022-10-11 | Stop reason: SDUPTHER

## 2022-10-11 RX ORDER — ACETAMINOPHEN 650 MG
TABLET, EXTENDED RELEASE ORAL
Status: DISCONTINUED
Start: 2022-10-11 | End: 2022-10-11

## 2022-10-11 RX ORDER — IBUPROFEN 600 MG/1
600 TABLET ORAL EVERY 6 HOURS PRN
Status: DISCONTINUED | OUTPATIENT
Start: 2022-10-11 | End: 2022-10-12 | Stop reason: HOSPADM

## 2022-10-11 RX ORDER — SODIUM CHLORIDE, SODIUM LACTATE, POTASSIUM CHLORIDE, CALCIUM CHLORIDE 600; 310; 30; 20 MG/100ML; MG/100ML; MG/100ML; MG/100ML
INJECTION, SOLUTION INTRAVENOUS CONTINUOUS
Status: DISCONTINUED | OUTPATIENT
Start: 2022-10-11 | End: 2022-10-11

## 2022-10-11 RX ADMIN — Medication 4 ML: at 15:11

## 2022-10-11 RX ADMIN — IBUPROFEN 600 MG: 600 TABLET, FILM COATED ORAL at 20:29

## 2022-10-11 RX ADMIN — Medication 15 ML/HR: at 15:07

## 2022-10-11 RX ADMIN — METHYLERGONOVINE MALEATE 200 MCG: 0.2 INJECTION, SOLUTION INTRAMUSCULAR; INTRAVENOUS at 17:26

## 2022-10-11 RX ADMIN — SODIUM CHLORIDE, POTASSIUM CHLORIDE, SODIUM LACTATE AND CALCIUM CHLORIDE: 600; 310; 30; 20 INJECTION, SOLUTION INTRAVENOUS at 14:10

## 2022-10-11 RX ADMIN — BUPIVACAINE HYDROCHLORIDE 5 ML: 2.5 INJECTION, SOLUTION EPIDURAL; INFILTRATION; INTRACAUDAL; PERINEURAL at 15:23

## 2022-10-11 RX ADMIN — BUPIVACAINE HYDROCHLORIDE 3 ML: 2.5 INJECTION, SOLUTION EPIDURAL; INFILTRATION; INTRACAUDAL; PERINEURAL at 15:31

## 2022-10-11 RX ADMIN — DOCUSATE SODIUM 100 MG: 100 CAPSULE, LIQUID FILLED ORAL at 20:29

## 2022-10-11 RX ADMIN — LIDOCAINE HYDROCHLORIDE,EPINEPHRINE BITARTRATE 3 ML: 15; .005 INJECTION, SOLUTION EPIDURAL; INFILTRATION; INTRACAUDAL; PERINEURAL at 15:01

## 2022-10-11 RX ADMIN — ONDANSETRON 4 MG: 2 INJECTION INTRAMUSCULAR; INTRAVENOUS at 16:49

## 2022-10-11 RX ADMIN — Medication 10 ML: at 15:06

## 2022-10-11 RX ADMIN — Medication 5 ML: at 15:16

## 2022-10-11 ASSESSMENT — LIFESTYLE VARIABLES: SMOKING_STATUS: 0

## 2022-10-11 NOTE — ANESTHESIA PROCEDURE NOTES
Epidural Block    Patient location during procedure: OB  Reason for block: labor epidural  Staffing  Performed: other anesthesia staff and resident/CRNA   Anesthesiologist: Jb Duff MD  Resident/CRNA: SANDIE Espino - IRWIN  Other anesthesia staff: Tammy Ordonez RN  Epidural  Patient position: sitting  Prep: ChloraPrep  Patient monitoring: continuous pulse ox and frequent blood pressure checks  Approach: midline  Location: L3-4  Injection technique: KARRI air  Provider prep: mask and sterile gloves  Needle  Needle type: Tuohy   Needle gauge: 18 G  Needle length: 3.5 in  Needle insertion depth: 5 cm  Catheter type: side hole  Catheter size: 20.  Catheter at skin depth: 13 cm  Test dose: negativeCatheter Secured: tegaderm and tape  Assessment  Hemodynamics: stable  Attempts: 2 (1 attempt by SRNA)  Outcomes: patient tolerated procedure well and uncomplicated  Preanesthetic Checklist  Completed: patient identified, IV checked, site marked, risks and benefits discussed, surgical/procedural consents, equipment checked, pre-op evaluation, timeout performed, anesthesia consent given, oxygen available, monitors applied/VS acknowledged, fire risk safety assessment completed and verbalized and blood product R/B/A discussed and consented

## 2022-10-11 NOTE — L&D DELIVERY NOTE
Department of Obstetrics and Gynecology  Spontaneous Vaginal Delivery Note    Patient:  Clive Koroma     Admit Date:  10/11/2022 12:20 PM  Medical Record Number:  [id number]   Procedure Date: 10/11/2022 6:24 PM     Pre-delivery Diagnosis: Multigravid postdates IUP at 41 weeks 4 days, uterine contractions in active labor, NIPT genetic testing increased risk for Klinefelter's (XXY) Rh-, history of anxiety  Patient Active Problem List   Diagnosis    Anxiety    Uterine contractions during pregnancy    Multigravida in third trimester    XXY identified by routine karyotyping (NIPT Screen - referred to [facility] Dr Roger Mancilla - decided against amniocentesis)    Genetic testing NIPT (XXY)    Rh negative state in antepartum period- Had rhogam    41 4/7 weeks gestation of pregnancy    Post-term pregnancy, 40-42 weeks of gestation     Post-delivery Diagnosis:  Living  infant Male, nuchal cord with compression, meconium fluid,  Patient Active Problem List   Diagnosis    Anxiety    Uterine contractions during pregnancy    Multigravida in third trimester    XXY identified by routine karyotyping (NIPT Screen - referred to [facility] Dr Roger Mancilla - decided against amniocentesis)    Genetic testing NIPT (XXY)    Rh negative state in antepartum period- Had rhogam    41 4/7 weeks gestation of pregnancy    Post-term pregnancy, 40-42 weeks of gestation     (normal spontaneous vaginal delivery)    Term birth of  male    Nuchal cord with compression, delivered, current hospitalization    Meconium in amniotic fluid     Information for the patient's :  Radha Porras [[id number]]   Normal Spontaneous Vaginal Delivery, uncomplicated     Delivering Clinician: Keyshawn Orellana Wt:   Information for the patient's :  Radha Porras [[id number]]   8 pounds 2 ounces  3690 g     APGARS:     Information for the patient's :  Radha Porras [[id number]]   1 minute: 8  5cminute: 9    Anesthesia:  epidural anesthesia    Application and Delivery:  29 y.o. W female [id number] at 41w4d admitted for spontaneous onset of labor who progressed to complete post amniotomy and delivered Cephalic, left occiput anterior presentation via  @ [phone], under epidural anesthesia anesthesia,  over an intact perineum without a resulting laceration, without dystocia or complication, a Live Born Male infant, weighing 8# 2oz, 3690 grams, Meconium fluid at delivery, bulb suctioned on perineum. A nuchal cord was present and reduced. A delayed cord clamping was performed. Spontaneous cry,  Apgar's 1 minute:  8; 5 minute:  9. The placenta was delivered with gentle traction and was noted to be intact, whole, and that the umbilical cord had three vessels noted. Episiotomy was not needed. Repair not necessary. Cervix, rectum, sphincter intact. Sponge, needle, and instrument counts correct x 2. Delivery Summary:  Mother's Information      Labor Events     Labor?: No  Cervical Ripening: N/A  Now     Sonal Lyles [[id number]]      Labor Events     Labor?: No   Steroids?: None  Cervical Ripening Date/Time: N/A    Antibiotics Received during Labor?: No  Rupture Identifier: Sac 1   Rupture Date/Time: 10/11/22 16:55:00   Rupture Type: AROM  Fluid Color: Clear, Bloody Show  Fluid Odor: None  Fluid Volume:  Moderate  Induction: None  Augmentation: AROM  Labor Complications: None, Meconium at birth    Anesthesia    Method: Epidural    Start Pushing      Labor onset date/time: 10/11/22 11:00:00 Now     Dilation complete date/time: 10/11/22 17:10:00 Now     Start pushing date/time: 10/11/2022 17:12:00     Labor Length    1st stage: 6h 10m  2nd stage: 0h 09m  3rd stage: 0h 06m    Delivery ([place])      Delivery Date/Time:  10/11/22 17:19:00   Delivery Type: Vaginal, Spontaneous    [place] Presentation    Presentation: Vertex  Position: Left  _: Occiput  _: Anterior    Shoulder Dystocia    Shoulder Dystocia Present?: No    Assisted Delivery Details    Forceps Attempted?: No  Vacuum Extractor Attempted?: No    Cord    Vessels: 3 Vessels  Complications: Nuchal Loose  Cord Around: Head  Delayed Cord Clamping?: Yes  Cord Clamped Date/Time: 10/11/2022 17:21:00  Cord Blood Disposition: Lab  Gases Sent?: Yes    Placenta    Date/Time: 10/11/2022 17:25:00  Removal: Spontaneous  Appearance: Intact  Disposition: Placenta Refrigerator    Lacerations    Episiotomy: None  Perineal Lacerations: None  Other Lacerations: no non-perineal laceration  Number of Repair Packets: 0    Vaginal Counts    Initial Count Personnel: Juan Booker  Initial Count Verified By: Miriam Patel    Sponges Needles Instruments   Initial Counts Correct Correct Correct   Final Counts Correct Correct Correct   Final Count Personnel: Erik Schroeder  Final Count Verified By: Miriam Amanda  Accurate Final Count?: Yes  If the count is incorrect due to Intentionally Retained Foreign Object (IRFO) add the IRFO LDA in Lines/Drains.   Add LDA: Link to [facility]    Blood Loss  Mother: Mari Acosta #[id number]     Start of Mother's Information      Delivery Blood Loss  10/11/22 1100 - 10/11/22 1825      Quantitative Blood Loss (mL) Hospital Encounter 105 grams    Total  105 mL     End of Mother's Information  Mother: Mari Acosta #[id number]      Delivery Providers    Delivering clinician: Kitty Corona MD     Provider Role    Kitty Corona MD [address], RN Primary Nurse    Evander Kocher, RN Primary  Nurse    Tamra Harmon RN  Secondary  Nurse         [place] Assessment    Living Status: Living  Delivery Location Comment: LD 3     Apgar Scoring Key:    0 1 2    Skin Color: Blue or pale Acrocyanotic Completely pink    Heart Rate: Absent <100 bpm >100 bpm    Reflex Irritability: No response Grimace Cry or active withdrawal    Muscle Tone: Limp Some flexion Active motion    Respiratory Effort: Absent Weak cry; hypoventilation Good, crying                      Skin Color: Heart Rate:   Reflex Irritability:   Muscle Tone:   Respiratory Effort: Total:            1 Minute:    0    2    2    2    2    8        Apgar 1 total from OB History    5 Minute:    1    2    2    2    2    9        Apgar 5 total from OB History              Apgars Assigned By: Lauri Feliz         Resuscitation    Method: Bulb Suction, Stimulation        [place] Measurements      Birth Weight: 3690 g Birth Length: 0.533 m     Head Circumference: 0.36 m          Title      Skin to Skin Initiation Date/Time: 10/11/22 17:20:00 EDT     Skin to Skin With: Mother        Specimen:  None. Estimated blood loss: 105 mL    Condition:  infant stable to general nursery and mother stable to maternity    Blood Type and Rh: A NEG    Rubella Immunity Status:   Immune           Infant Feeding:    breast    Attending Attestation: I performed the procedure.     Charmaine Hayes MD MD, Sharath Page  10/11/2022 6:24 PM

## 2022-10-11 NOTE — PROGRESS NOTES
Updated Dr. Lana Kilgore on sve 9/100/+1 with bulging bag. House officer at bedside to AROM. Reviewed repetitive late decelerations with minimal variability now. Started around [id number]. Patient has been repositioned multiple times, iv bolus going and o2 on.

## 2022-10-11 NOTE — ANESTHESIA PRE PROCEDURE
Department of Anesthesiology  Preprocedure Note       Name:  Jon Lopez   Age:  29 y.o.  :  1990                                          MRN:  [id number]         Date:  10/11/2022      Surgeon: Dr. Daria Choi  Procedure: Labor Analgesia    Medications prior to admission:   Prior to Admission medications    Medication Sig Start Date End Date Taking? Authorizing Provider   Bacillus Coagulans-Inulin (PROBIOTIC) 1-250 BILLION-MG CAPS Take by mouth    Historical Provider, MD   Cholecalciferol (VITAMIN D) 50 MCG (2000) CAPS capsule Take by mouth    Historical Provider, MD   NONFORMULARY Indications: vitafusion fiber gummies     Historical Provider, MD   Prenatal Vit-Fe Fumarate-FA (PRENATAL 19 PO) Take by mouth    Historical Provider, MD       Current medications:    No current facility-administered medications for this visit. No current outpatient medications on file.      Facility-Administered Medications Ordered in Other Visits   Medication Dose Route Frequency Provider Last Rate Last Admin    lactated ringers infusion   IntraVENous Continuous Cynthia Stover MD        lactated ringers bolus  500 mL IntraVENous PRN Cynthia Stover MD        Or   Lorene Roe lactated ringers bolus  1,000 mL IntraVENous PRN Cynthia Stover MD        sodium chloride flush 0.9 % injection 5-40 mL  5-40 mL IntraVENous 2 times per day Cynthia Stover MD        sodium chloride flush 0.9 % injection 5-40 mL  5-40 mL IntraVENous PRN Cynthia Stover MD        0.9 % sodium chloride infusion  25 mL IntraVENous PRN Cynthia Stover MD        methylergonovine (METHERGINE) injection 200 mcg  200 mcg IntraMUSCular PRN Cynthia Stover MD        carboprost (HEMABATE) injection 250 mcg  250 mcg IntraMUSCular PRN Cynthia Stover MD        miSOPROStol (CYTOTEC) tablet 800 mcg  800 mcg Rectal PRN Cynthia Stover MD        tranexamic acid (CYCLOKAPRON) 1000 mg in sodium chloride 0.9 % 50 mL IVPB 1,000 mg IntraVENous Once PRN Andreia Figueroa MD        oxytocin (PITOCIN) 30 units in 500 mL infusion  87.3 stephanie-units/min IntraVENous Continuous PRN Andreia Figueroa MD        And    oxytocin (PITOCIN) 10 unit bolus from the bag  10 Units IntraVENous PRN Andreia Figueroa MD        ondansetron [facility]) injection 4 mg  4 mg IntraVENous Q6H PRN Andreia Figueroa MD        docusate sodium (COLACE) capsule 100 mg  100 mg Oral BID Andreia Figueroa MD           Allergies:    No Known Allergies    Problem List:    Patient Active Problem List   Diagnosis Code    Anxiety F41.9    Uterine contractions during pregnancy O47.9    Multigravida in third trimester Z34.83    XXY identified by routine karyotyping (NIPT Screen - referred to [facility] [address] - decided against amniocentesis) R89.8    Rh negative state in antepartum period- Had rhogam O26.899, Z67.91    39 weeks gestation of pregnancy Z3A.39       Past Medical History:        Diagnosis Date    Anxiety     Asthma        Past Surgical History:  No past surgical history on file. Social History:    Social History     Tobacco Use    Smoking status: Never    Smokeless tobacco: Never   Substance Use Topics    Alcohol use: Never                                Counseling given: Not Answered      Vital Signs (Current): There were no vitals filed for this visit.                                            BP Readings from Last 3 Encounters:   10/10/22 130/88   10/03/22 120/72   09/26/22 128/78       NPO Status:                                                                                 BMI:   Wt Readings from Last 3 Encounters:   10/10/22 166 lb 9.6 oz (75.6 kg)   10/03/22 167 lb (75.8 kg)   09/26/22 165 lb 12.8 oz (75.2 kg)     There is no height or weight on file to calculate BMI.    CBC:   Lab Results   Component Value Date/Time    WBC 8.6 08/29/2022 02:00 PM    RBC 3.89 08/29/2022 02:00 PM    HGB 13.1 08/29/2022 02:00 PM    HCT 38.1 08/29/2022 02:00 PM    MCV 97.9 08/29/2022 02:00 PM    RDW 12.4 08/29/2022 02:00 PM     08/29/2022 02:00 PM       CMP: No results found for: NA, K, CL, CO2, BUN, CREATININE, GFRAA, AGRATIO, LABGLOM, GLUCOSE, GLU, PROT, CALCIUM, BILITOT, ALKPHOS, AST, ALT    POC Tests: No results for input(s): POCGLU, POCNA, POCK, POCCL, POCBUN, POCHEMO, POCHCT in the last 72 hours. Coags: No results found for: PROTIME, INR, APTT    HCG (If Applicable): No results found for: PREGTESTUR, PREGSERUM, HCG, HCGQUANT     ABGs: No results found for: PHART, PO2ART, PAM2QSW, ZGY6WZH, BEART, P2LYOXVI     Type & Screen (If Applicable):  No results found for: LABABO, LABRH    Drug/Infectious Status (If Applicable):  No results found for: HIV, HEPCAB    COVID-19 Screening (If Applicable): No results found for: COVID19      Anesthesia Evaluation  Patient summary reviewed and Nursing notes reviewed no history of anesthetic complications:   Airway: Mallampati: II  TM distance: >3 FB   Neck ROM: full  Mouth opening: > = 3 FB   Dental: normal exam     Comment: Denies loose or chipped teeth    Pulmonary:Negative Pulmonary ROS and normal exam  breath sounds clear to auscultation  (+) asthma (childhood asthma ):     (-) not a current smoker          Patient did not smoke on day of surgery. Cardiovascular:Negative CV ROS  Exercise tolerance: good (>4 METS),           Rhythm: regular  Rate: normal                    Neuro/Psych:   (+) depression/anxiety             GI/Hepatic/Renal: Neg GI/Hepatic/Renal ROS            Endo/Other: Negative Endo/Other ROS                    Abdominal:              PE comment: Round large and pregnant   Vascular: negative vascular ROS. Other Findings:             Anesthesia Plan      epidural, spinal and general     ASA 2             Anesthetic plan and risks discussed with patient. Use of blood products discussed with patient whom consented to blood products.    Plan discussed with CRNA and attending. Nishant Hoffman RN   10/11/2022    Chart reviewed, patient seen. Agree with above assessment.     Deedee Au, APRN - CRNA    10/11/22

## 2022-10-11 NOTE — PROGRESS NOTES
Updated Dr. Feliciano Anderson on sve 8/100/0 and feeling some pressure.  Patient just got epidural, only getting some relief

## 2022-10-11 NOTE — H&P
Department of Obstetrics and Gynecology  Physician Assistant Obstetrics History and Physical      HISTORY OF PRESENT ILLNESS:      The patient is a 29 y.o.  2 parity 1 at 36 weeks' 4 days' gestation presents to L&D from home c/o contractions. SVE at office yesterday: 4 cms dilated, 80% effaced, -1 station, cephalic per u/s. Current obstetric history is significant for:  Low-lying placenta in second trimester resolved 22   Rh negative state in antepartum period- Had rhogam     Multigravida in third trimester      Estimated Due Date:  2022  Contractions: Yes  Leaking of fluid: No  Bleeding:  No  Perceived fetal movement: Good        PAST OB HISTORY:  OB History    Para Term  AB Living   2 1 1     1   SAB IAB Ectopic Molar Multiple Live Births           0 1      # Outcome Date GA Lbr Des/2nd Weight Sex Delivery Anes PTL Lv   2 Current            1 Term 20 40w5d 07:32 / 04:19 6 lb 12.6 oz (3.08 kg) M Vag-Spont EPI N FAB           Pre-eclampsia:  No      :  No      D & C:  No      Cerclage:  No      LEEP:  No      Myomectomy:  No       Labor: No    Past Medical History:  Denies hypertension, diabetes, cardiac or thyroid disease  Diagnosis Date    Anxiety     Asthma         Past Surgical History:    No past surgical history on file. Social History:    Reports that she has never smoked. She has never used smokeless tobacco. She reports that she does not drink alcohol and does not use drugs.      Blood type: A negative  Antibody screen:   Lab Results   Component Value Date    LABANTI NEG 2022   CBC:   Lab Results   Component Value Date    WBC 8.6 2022    HGB 13.1 2022    HCT 38.1 2022    MCV 97.9 2022     2022   Rubella: Immune  RPR:Non-reactive  Hepatitis B Surface Antigen: Non-reactive  HIV: Non-reactive  Gonorrhea:   Lab Results   Component Value Date    GONDNA NEGATIVE 2022   Chlamydia:   Lab Results   Component Value Date    LABCHLA NEGATIVE 2022   Group B Strep: Negative    Medications Prior to Admission:  Medications Prior to Admission: Bacillus Coagulans-Inulin (PROBIOTIC) 1-250 BILLION-MG CAPS, Take by mouth  Cholecalciferol (VITAMIN D) 50 MCG ( [place]) CAPS capsule, Take by mouth  NONFORMULARY, Indications: vitafusion fiber gummies   Prenatal Vit-Fe Fumarate-FA (PRENATAL 19 PO), Take by mouth    Allergies:  Patient has no known allergies. ROS:  Const: No fever, chills, night sweats, no recent unexplained weight gain/loss  HEENT: No blurred vision, double vision; no ear problems; no sore throat, congestion; no running nose. Resp: No cough, no sputum, no pleuritic chest pain, no sob  Cardio: No chest pain, no exertional dyspnea, no PND, no orthopnea, no palpitation, no leg swelling. GI: No dysphagia, no reflux; no abdominal pain, no n/v; no c/d.  No hematochezia    : No dysuria, no frequency, hesitancy; no hematuria  MSK: no joint pain, no myalgia, no change in ROM  Neuro: no focal weakness in extremities, no slurred speech, no double vision, no numbness or tingling in extremities  Endo: no heat/cold intolerance, no polyphagia, polydipsia or polyuria  Hem: no increased bleeding, no bruising, no lymphadenopathy  Skin: no skin changes  Psych: no depressed mood, no suicidal ideation  Pertinent +'s & -'s addressed in HPI    PHYSICAL EXAM:  LMP 2021 HR 83, RR 14, /80    General appearance: Comfortable  Lungs:  CTA bilaterally, good excursion  Heart:  Regular Rate & Rhythm, no murmur noted  Abdomen:  Soft, non-tender, gravid  Uterus: soft, nontender  Fetal heart rate:  Baseline Heart Rate 130; variability: moderate;  accelerations: present;  decelerations: absent  Cervix: SVE per RN  DILATION: 4 cm  EFFACEMENT:   75%  STATION:  -1 cm  Presentation: Cephalic  Contraction frequency:  irregular  Membranes:  Intact  Extremities: (-) edema       ASSESSMENT:   30 yo  IUP at 41 weeks' 4 days' gestation Here for IOL         Plan: RN obtained orders from Dr. Pandey Salvage:  EFM  Admit, anticipate normal delivery, routine labor orders  Other: AROM when able, Stadol, Pitocin, Epidural      Electronically signed by Suad Cee PA-C on 10/11/2022 at 1:03 PM

## 2022-10-12 VITALS
RESPIRATION RATE: 18 BRPM | OXYGEN SATURATION: 100 % | DIASTOLIC BLOOD PRESSURE: 63 MMHG | HEART RATE: 90 BPM | TEMPERATURE: 98.7 F | SYSTOLIC BLOOD PRESSURE: 130 MMHG

## 2022-10-12 PROBLEM — Z3A.41 41 WEEKS GESTATION OF PREGNANCY: Status: RESOLVED | Noted: 2022-10-11 | Resolved: 2022-10-12

## 2022-10-12 PROBLEM — R89.8: Status: RESOLVED | Noted: 2022-09-07 | Resolved: 2022-10-12

## 2022-10-12 PROBLEM — Z34.83 MULTIGRAVIDA IN THIRD TRIMESTER: Status: RESOLVED | Noted: 2022-09-07 | Resolved: 2022-10-12

## 2022-10-12 PROBLEM — Z67.91 RH NEGATIVE STATE IN ANTEPARTUM PERIOD: Status: RESOLVED | Noted: 2022-09-07 | Resolved: 2022-10-12

## 2022-10-12 PROBLEM — O48.0 41 WEEKS GESTATION OF PREGNANCY: Status: RESOLVED | Noted: 2022-10-11 | Resolved: 2022-10-12

## 2022-10-12 PROBLEM — O26.899 RH NEGATIVE STATE IN ANTEPARTUM PERIOD: Status: RESOLVED | Noted: 2022-09-07 | Resolved: 2022-10-12

## 2022-10-12 PROBLEM — Z13.79 GENETIC TESTING: Status: RESOLVED | Noted: 2022-09-07 | Resolved: 2022-10-12

## 2022-10-12 PROBLEM — O48.0 POST-TERM PREGNANCY, 40-42 WEEKS OF GESTATION: Status: RESOLVED | Noted: 2022-10-11 | Resolved: 2022-10-12

## 2022-10-12 PROCEDURE — 6370000000 HC RX 637 (ALT 250 FOR IP): Performed by: OBSTETRICS & GYNECOLOGY

## 2022-10-12 RX ORDER — PSEUDOEPHEDRINE HCL 30 MG
100 TABLET ORAL 2 TIMES DAILY PRN
COMMUNITY
Start: 2022-10-12

## 2022-10-12 RX ORDER — MODIFIED LANOLIN
1 OINTMENT (GRAM) TOPICAL PRN
COMMUNITY
Start: 2022-10-12

## 2022-10-12 RX ORDER — IBUPROFEN 600 MG/1
600 TABLET ORAL EVERY 6 HOURS PRN
Qty: 60 TABLET | Refills: 1 | Status: SHIPPED | OUTPATIENT
Start: 2022-10-12

## 2022-10-12 RX ADMIN — METFORMIN HYDROCHLORIDE 1 TABLET: 500 TABLET, EXTENDED RELEASE ORAL at 10:31

## 2022-10-12 RX ADMIN — IBUPROFEN 600 MG: 600 TABLET, FILM COATED ORAL at 03:57

## 2022-10-12 RX ADMIN — DOCUSATE SODIUM 100 MG: 100 CAPSULE, LIQUID FILLED ORAL at 10:31

## 2022-10-12 ASSESSMENT — PAIN DESCRIPTION - DESCRIPTORS: DESCRIPTORS: SORE

## 2022-10-12 ASSESSMENT — PAIN SCALES - GENERAL: PAINLEVEL_OUTOF10: 2

## 2022-10-12 ASSESSMENT — PAIN DESCRIPTION - LOCATION: LOCATION: BACK

## 2022-10-12 NOTE — ANESTHESIA POSTPROCEDURE EVALUATION
Department of Anesthesiology  Postprocedure Note    Patient: Mandi Dominguez  MRN: [id number]  YOB: 1990  Date of evaluation: 10/12/2022      Procedure Summary     Date: 10/11/22 Room / Location:     Anesthesia Start: 1447 Anesthesia Stop: 1719    Procedure: Labor Analgesia Diagnosis:     Scheduled Providers:  Responsible Provider: Mariel Douglas MD    Anesthesia Type: epidural, spinal, general ASA Status: 2          Anesthesia Type: No value filed.     Joycelyn Phase I:      Joycelyn Phase II:        Anesthesia Post Evaluation    Patient location during evaluation: bedside  Patient participation: complete - patient participated  Level of consciousness: awake and alert  Pain score: 0  Airway patency: patent  Nausea & Vomiting: no nausea and no vomiting  Complications: no  Cardiovascular status: hemodynamically stable  Respiratory status: acceptable and room air  Hydration status: euvolemic

## 2022-10-12 NOTE — PROGRESS NOTES
Pt states she feels like she needs to void but is unable to and had to go home with a catheter with her last baby. Last straight cath at 1600. 800 mL clear yellow urine after straight cath 2230. Pt states she feels much better.

## 2022-10-12 NOTE — PROGRESS NOTES
PPD #1     Patient:  Rosa Chris     Admit Date:  10/11/2022 12:20 PM  Medical Record Number:  [id number]   Today's Date: 10/12/2022    S: No complaints, doing well, would like a day #1 discharge; tolerating diet: yes -general; ambulating well: yes - up in room; voiding without difficulty:  yes -has no urinary complaints; bm: denies urge; flatus: yes -normal; pain meds appropriate: yes -ibuprofen 600 mg; vaginal bleeding: a little heavier than her normal period - but no clots, seems like the normal amount from her prior delivery.     O:   Vitals:    10/11/22 1921 10/11/22 1936 10/11/22 2029 10/11/22 2337   BP: (!) 158/70 (!) 147/71 (!) 149/86 136/85   Pulse: 94 93 96 94   Resp:   16 16   Temp:   97.5 °F (36.4 °C) 98.3 °F (36.8 °C)   TempSrc:   Oral Oral   SpO2:   99% 98%     Gen: A&O, cooperative, pleasant   Heart: RRR   Lungs: CTA b/l   Abd; soft, NT, non distended, +BS  Back: no CVA or paraspinal tenderness   Ext: No clubbing, cyanosis, edema:trace , no cords palpable, no calf tenderness   Neuro: intact   Uterus: firm, well contracted, nt   Perineum: intact, healing well   Nina pad: staining only, thin lochia    Lab Results   Component Value Date    HGB 13.5 10/11/2022    HCT 39.8 10/11/2022      Recent Results (from the past 72 hour(s))   CBC    Collection Time: 10/11/22  1:11 PM   Result Value Ref Range    WBC 11.2 4.5 - 11.5 E9/L    RBC 4.21 3.50 - 5.50 E12/L    Hemoglobin 13.5 11.5 - 15.5 g/dL    Hematocrit 39.8 34.0 - 48.0 %    MCV 94.5 80.0 - 99.9 fL    MCH 32.1 26.0 - 35.0 pg    MCHC 33.9 32.0 - 34.5 %    RDW 12.1 11.5 - 15.0 fL    Platelets 861 474 - 296 E9/L    MPV 11.1 7.0 - 12.0 fL   TYPE AND SCREEN    Collection Time: 10/11/22  1:11 PM   Result Value Ref Range    ABO/Rh A NEG     Antibody Screen NEG    Urine Drug Screen    Collection Time: 10/11/22  1:23 PM   Result Value Ref Range    Amphetamine Screen, Urine NOT DETECTED Negative <1000 ng/mL    Barbiturate Screen, Ur NOT DETECTED Negative < 200 ng/mL Benzodiazepine Screen, Urine NOT DETECTED Negative < 200 ng/mL    Cannabinoid Scrn, Ur NOT DETECTED Negative < 50ng/mL    Cocaine Metabolite Screen, Urine NOT DETECTED Negative < 300 ng/mL    Opiate Scrn, Ur NOT DETECTED Negative < 300ng/mL    PCP Screen, Urine NOT DETECTED Negative < 25 ng/mL    Methadone Screen, Urine NOT DETECTED Negative <300 ng/mL    Oxycodone Urine NOT DETECTED Negative <100 ng/mL    FENTANYL SCREEN, URINE NOT DETECTED Negative <1 ng/mL    Drug Screen Comment: see below        A: 29 y.o. female  at 41w4d weeks  PPD #1 S/P ; Episiotomy was not needed  BP elevations PP - not in severe range. Patient Active Problem List   Diagnosis    Anxiety    Uterine contractions during pregnancy    Multigravida in third trimester    XXY identified by routine karyotyping (NIPT Screen - referred to [facility] Dr Fina Castillo - decided against amniocentesis)    Genetic testing NIPT (XXY)    Rh negative state in antepartum period- Had rhogam    41 4/7 weeks gestation of pregnancy    Post-term pregnancy, 40-42 weeks of gestation     (normal spontaneous vaginal delivery)    Term birth of  male    Nuchal cord with compression, delivered, current hospitalization    Meconium in amniotic fluid       P: Routine PP care. Discharge home with instructions, precautions. Prescription for Ibuprofen 600 mg. Continue PNV with Iron daily. Follow-up office 6 weeks for postpartum visit. Preeclampsia precautions.     Swapnil Fuchs MD FACOG  10/12/2022 12:55 PM

## 2022-10-12 NOTE — DISCHARGE INSTR - DIET

## 2022-10-12 NOTE — LACTATION NOTE
Pt is a multip with a less than ideal BF history. She suspected her first baby was tongue tied, did not have evaluated d/t pandemic. She would like to BF this baby for 6-12 months. She feels this baby is latching better than first and denies concerns. Baby gagging and bring up copious amounts of fluid and mucous streaked with colostrum. Encouraged skin to skin and frequent attempts at breast to stimulate milk production. Instructed on normal infant behavior in the first 12-24 hours and importance of stimulating the baby frequently to eat during this time. Reviewed hand expression, and encouraged to hand express drops of colostrum when baby is sleepy. Instructed that baby may also feed 8-12 times a day- cluster feeding at times- as her milk supply is being established. Instructed on benefits of skin to skin and avoidance of pacifier / artificial nipple use until breastfeeding is well established. Educated on making sure infant has an open airway while breastfeeding and skin to skin. Instructed on hunger cues and waking techniques to try. Reviewed signs of adequate I & O; allow baby to feed ad temo and not to limit time at breast. Breastfeeding booklet provided with review of its contents. Encouraged to call with any concerns. Pt has EBP for personal use once home.

## 2022-10-12 NOTE — PROGRESS NOTES
Pt ambulated to restroom with nurse assistance. Nina care performed. Pt did not void. Pt tolerated well.  Pt to be transferred to mom/baby

## 2022-10-12 NOTE — DISCHARGE INSTR - ACTIVITY
After Your Delivery Discharge Instructions    What to do after you leave the hospital:    Recommended diet: regular diet  Recommended activity: No driving for 1 weeks, no sex or tampon use for 6 weeks. No douching. No heavy lifting (greater than baby and carrier) for 6 weeks. Initially, avoid frequent ambulation with stairs - start slowly then increase as tolerated. You may return to work in 6 weeks. Showers are fine - avoid bath tubs, hot tubs, swimming. Follow-up in 6 weeks for final postpartum visit. Call the Physician with any of these signs and symptoms:    Warning signs regarding stitches:  \"Popping\" of stitches  Foul smelling discharge or pus  More redness or streaks around stitches than before    Perineum / episiotomy care:  Continue care as in the hospital (sitz baths, squirt bottle, etc.)  No tub baths until OK'd by your Physician , showers are fine     After your delivery - signs and symptoms to watch for:  Fever - Oral temperature greater than 100.4 degrees Fahrenheit  Foul-smelling vaginal discharge  Headache unrelieved by \"pain meds\" especially if there is blurred vision, douple vision or seeing spots  Rapid weight gain and sweling  Persistent or severe right upper quadrant or mid upper abdominal pains  Difficulty urinating  Breasts reddened, hard, hot to the touch  Nipple discharge which is foul-smelling or contains pus  Increased pain at the site of the  vaginal area  Difficulty breathing with or without chest pain  New calf pain especially if only on one side  Sudden, continuing increased vaginal bleeding (heavier than a period) with or without clots  Unrelieved feelings of:   Inability to cope  Sadness  Anxiety  Lack of interest in baby  Insomnia  Crying     What to do at home:  Resume activity gradually   Don't lift anything heavier than baby and carrier until OK'd by your Physician   No sex until OK'd by your Physician  Eat regular nutritious meals  Take pain medication as prescribed whenever you need them  To avoid/relieve constipation take stool softeners if advised   Increase fiber in your diet    Most importantly ENJOY your Baby!  - Dr. Aziza Stinson =)))    Please call immediately with Questions and Concerns - [phone] (Office) or [phone] (Answering Service) after hours and weekends. By signing below, I understand that if any emergent problems occur, once I leave the hospital, I am to dial #[phone] or go immediately to the nearest Emergency Room. For less emergent matters,  Dr. Patt Benedict can be reached by calling his Office or  answering service at the above numbers. I understand and acknowledge receipt of the instructions indicated above.

## 2022-10-12 NOTE — PLAN OF CARE
Problem: Pain  Goal: Verbalizes/displays adequate comfort level or baseline comfort level  Outcome: Progressing     Problem: Postpartum  Goal: Experiences normal postpartum course  Description:  Postpartum OB-Pregnancy care plan goal which identifies if the mother is experiencing a normal postpartum course  Outcome: Progressing  Goal: Appropriate maternal -  bonding  Description:  Postpartum OB-Pregnancy care plan goal which identifies if the mother and  are bonding appropriately  Outcome: Progressing  Goal: Establishment of infant feeding pattern  Description:  Postpartum OB-Pregnancy care plan goal which identifies if the mother is establishing a feeding pattern with their   Outcome: Progressing     Problem: Infection - Adult  Goal: Absence of infection during hospitalization  Outcome: Progressing

## 2022-10-12 NOTE — PROGRESS NOTES
[facility] [place] [facility] screening results were discussed with parent. Questions answered. Brochure given to parent. Advised to monitor developmental milestones and contact physician for any concerns.    Rui Watkins AuD

## 2022-10-12 NOTE — DISCHARGE INSTRUCTIONS
Follow-up with your OB doctor in 1 week if  delivery or in  6 weeks for vaginal delivery unless otherwise instructed. Call office for an appointment. For breastfeeding support, you can contact our lactation specialists at [phone] or [phone]    DIET  Eat a well balanced diet focusing on foods high in fiber and protein  Drink plenty of fluids especially water. To avoid constipation you may take a mild stool softener as recommended by your doctor or midwife. ACTIVITY  Gradually increase your activity. Resume exercise regimen only after advised by your doctor or midwife. Avoid lifting anything heavier than your baby or a gallon of milk for SIX weeks. Avoid driving until your doctor or midwife has given their approval.  Blayne Keepers slowly from a lying to sitting and then a standing position. Climb stairs one at a time. Use caution when carrying your baby up and down the stairs. No sexual activity for 6 weeks or until advised by your doctor - Nothing in vagina: intercourse, tampons, or douching. Be prepared to discuss family planning at your follow-up OB visit. You may feel tired or have a lack of energy. You may continue your prenatal vitamin to replenish nutrients post delivery. Nap when baby naps to catch up on sleep. May return to work or school in 6 weeks or as directed by OB. EMOTIONS  You may feed alvarez, sad, teary, & overwhelmed. Contact your OB provider if you feel you may be showing signs of postpartum depression, or have thoughts of harming yourself or your infant. If infant will not stop crying, contact another adult for help or place infant in their crib on their back and take a break. NEVER shake your infant. BLEEDING  Vaginal bleeding will decrease in amount over the next few weeks. You will notice that as your activity increases, your flow may increase. This is your body's way of telling you, you need to take things easier and rest more often.   Call your OB/ER if you are saturating more than one maxi pad in an hour. BREAST CARE  Take medications as recommended by your doctor or midwife for pain  If you develop a warm, red, tender area on your breast or develop a fever contact your OB provider. For breastfeeding moms:  If you become engorged, feeding may be more difficult or painful for 1-2 days. You may find it helpful to hand express some milk so that the infant can latch on more easily. While breastfeeding, continue to take your prenatal vitamins as directed by your doctor or midwife. Only take medications verified as safe for breastfeeding. For non-breastfeeding moms:  You may apply ice packs to your breasts over your bra for twenty minutes at a time for comfort. Avoid stimulation to your breasts, when showering allow the water to strike your back not your breasts. Wear a good fitting bra until your milk dries, such as a sports bra. INCISIONAL CARE / JENISE CARE  Clean your incision in the shower with mild soap. After shower pat the incision area dry and leave open to air. If used, Steri-stipes should be removed by 2 weeks. If used, Tatiana Sample should be removed by the OB in office by 1 week. If used/ordered, an abdominal binder may provide support for your incision. Use the jenise-bottle after toileting until bleeding stops. Cleanse your perineum from front to back  If used, stitches or internal clips will dissolve in 4-6 weeks. You may use a sitz bath or soak in a clean tub as needed for comfort. Kegel exercises will help restore bladder control. SWELLING  Keep your legs elevated when sitting or lying. When wearing stocking or socks, make sure they are not too tight. WHEN TO CALL THE DOCTOR  If you have a temp of 100.4 or more. If your bleeding has increased and you are saturating a pad in an hour. Your abdomen is tender to touch. You are passing blood clots bigger than the size of a lemon.   If you are experiencing extreme weakness or dizziness. If you are having flu-like symptoms such as achy muscles or joints. There is a foul smell or a green color to your vaginal bleeding. If you have pain that cannot be relieved. You have persistent burning with urination or frequency. Call if you have concerns about your well-being. You are unable to sleep, eat, or are having thoughts of harming yourself or your baby. You have swelling, bleeding, drainage, foul odor, redness, or warmth in/around your incision or stitches. You have a red, warm, tender area in you calf. Never Shake a Baby Promise    Shaking can kill a baby. It can also cause seizures, brain damage, learning problems, cerebral palsy, blindness and other serious health and developmental problems. I have seen the video about shaking a baby and understand that shaking a baby is a serious form of child abuse. I Promise Never To Shake My Baby    I understand that caregivers other then the mother often shake babies. I also promise to discuss the dangers of shaking a baby with everyone who takes care of my baby. I promise to tell anyone who cares for my baby to never, never shake my baby. I have received the [address] [place] [address] Baby Syndrome Teaching Tool and Certificate.

## 2022-10-12 NOTE — DISCHARGE SUMMARY
Department of Obstetrics and Gynecology  Labor and Delivery  Discharge Summary    Patient:  Chau Perdomo         Medical Record Number:  [id number]    Admit Date:  10/11/2022 12:20 PM    Discharge Date: 10/12/2022    Final Diagnosis:   Principal Problem (Resolved):    41 4/7 weeks gestation of pregnancy  Active Problems:     (normal spontaneous vaginal delivery)    Term birth of  male  Resolved Problems:    Uterine contractions during pregnancy    Post-term pregnancy, 40-42 weeks of gestation    Multigravida in third trimester    XXY identified by routine karyotyping (NIPT Screen - referred to [facility] Dr Chapman Or - decided against amniocentesis)    Genetic testing NIPT (XXY)    Rh negative state in antepartum period- Had rhogam    Nuchal cord with compression, delivered, current hospitalization    Meconium in amniotic fluid      Chief Complaint - Presenting Illness - Physical Findings:   39 weeks gestation of pregnancy [Z3A.39]  HPI: F at 41 weeks 4 days who presented to labor and delivery with a complaint of contractions. Patient was found to be in active labor 4 cm / 75% -1 station with irregular contractions. She was admitted for expectant management of labor.     Hospital Course:   Delivery Summary:   Procedure Date: 10/11/2022 6:24 PM      Pre-delivery Diagnosis: Multigravid postdates IUP at 41 weeks 4 days, uterine contractions in active labor, NIPT genetic testing increased risk for Klinefelter's (XXY) Rh-, history of anxiety      Patient Active Problem List   Diagnosis    Anxiety    Uterine contractions during pregnancy    Multigravida in third trimester    XXY identified by routine karyotyping (NIPT Screen - referred to [facility] Dr Chapman Or - decided against amniocentesis)    Genetic testing NIPT (XXY)    Rh negative state in antepartum period- Had rhogam    41 4/7 weeks gestation of pregnancy    Post-term pregnancy, 40-42 weeks of gestation      Post-delivery Diagnosis:  Living  infant Male, nuchal cord with compression, meconium fluid,      Patient Active Problem List   Diagnosis    Anxiety    Uterine contractions during pregnancy    Multigravida in third trimester    XXY identified by routine karyotyping (NIPT Screen - referred to [facility] Dr Anjel Rogers - decided against amniocentesis)    Genetic testing NIPT (XXY)    Rh negative state in antepartum period- Had rhogam    41 4/7 weeks gestation of pregnancy    Post-term pregnancy, 40-42 weeks of gestation     (normal spontaneous vaginal delivery)    Term birth of  male    Nuchal cord with compression, delivered, current hospitalization    Meconium in amniotic fluid      Information for the patient's :  Gladys Wick [[id number]]   Normal Spontaneous Vaginal Delivery, uncomplicated  Delivering Clinician: Keyshawn Orellana Wt:   Information for the patient's :  Latanya Rangel [[id number]]   8 pounds 2 ounces  3690 g     APGARS:            Information for the patient's :  Latanya Rangel [[id number]]   1 minute: 8  5cminute: 9     Anesthesia:  epidural anesthesia     Application and Delivery:  29 y.o. W female [id number] at 41w4d admitted for spontaneous onset of labor who progressed to complete post amniotomy and delivered Cephalic, left occiput anterior presentation via  @ [phone], under epidural anesthesia anesthesia,  over an intact perineum without a resulting laceration, without dystocia or complication, a Live Born Male infant, weighing 8# 2oz, 3690 grams, Meconium fluid at delivery, bulb suctioned on perineum. A nuchal cord was present and reduced. A delayed cord clamping was performed. Spontaneous cry,  Apgar's 1 minute:  8; 5 minute:  9. The placenta was delivered with gentle traction and was noted to be intact, whole, and that the umbilical cord had three vessels noted. Episiotomy was not needed. Repair not necessary. Cervix, rectum, sphincter intact. Sponge, needle, and instrument counts correct x 2. Delivery Summary:  Mother's Information       Labor Events     Labor?: No  Cervical Ripening: N/A   Now      Trupti, Baby Boy Betty Henry [[id number]]       Labor Events     Labor?: No   Steroids?: None  Cervical Ripening Date/Time: N/A     Antibiotics Received during Labor?: No       Rupture Identifier: Sac 1   Rupture Date/Time: 10/11/22 16:55:00   Rupture Type: AROM  Fluid Color: Clear, Bloody Show  Fluid Odor: None  Fluid Volume:  Moderate  Induction: None  Augmentation: AROM  Labor Complications: None, Meconium at birth     Anesthesia    Method: Epidural     Start Pushing       Labor onset date/time: 10/11/22 11:00:00 Now      Dilation complete date/time: 10/11/22 17:10:00 Now      Start pushing date/time: 10/11/2022 17:12:00      Labor Length    1st stage: 6h 10m  2nd stage: 0h 09m  3rd stage: 0h 06m     Delivery ()       Delivery Date/Time:  10/11/22 17:19:00   Delivery Type: Vaginal, Spontaneous      Presentation    Presentation: Vertex  Position: Left  _: Occiput  _: Anterior     Shoulder Dystocia    Shoulder Dystocia Present?: No     Assisted Delivery Details    Forceps Attempted?: No  Vacuum Extractor Attempted?: No     Cord    Vessels: 3 Vessels  Complications: Nuchal Loose  Cord Around: Head  Delayed Cord Clamping?: Yes  Cord Clamped Date/Time: 10/11/2022 17:21:00  Cord Blood Disposition: Lab  Gases Sent?: Yes     Placenta    Date/Time: 10/11/2022 17:25:00  Removal: Spontaneous  Appearance: Intact  Disposition: Placenta Refrigerator     Lacerations    Episiotomy: None  Perineal Lacerations: None  Other Lacerations: no non-perineal laceration  Number of Repair Packets: 0     Vaginal Counts    Initial Count Personnel: Polly Marx  Initial Count Verified By: Mari Chappell    Sponges Needles Instruments   Initial Counts Correct Correct Correct   Final Counts Correct Correct Correct   Final Count Personnel: Florentino Moncada  Final Count Verified By: Mari Chappell  Accurate Final Count?: Yes  If the count is incorrect due to Intentionally Retained Foreign Object (IRFO) add the IRFO LDA in Lines/Drains. Add LDA: Link to [facility]     Blood Loss  Mother: Amos Numbers #[id number]      Start of Mother's Information       Delivery Blood Loss  10/11/22 1100 - 10/11/22 1825        Quantitative Blood Loss (mL) Hospital Encounter 105 grams     Total   105 mL      End of Mother's Information  Mother: Amos Numbers #[id number]        Delivery Providers    Delivering clinician: Swapnil Fuchs MD       Provider Role     MD Nacho Pittman, RN Primary Nurse     Eldon Singh, RN Primary [place] Nurse     Kayce Shine, RN  Secondary  Nurse            Assessment    Living Status: Living  Delivery Location Comment: LD 3       Apgar Scoring Key:    0 1 2     Skin Color: Blue or pale Acrocyanotic Completely pink     Heart Rate: Absent <100 bpm >100 bpm     Reflex Irritability: No response Grimace Cry or active withdrawal     Muscle Tone: Limp Some flexion Active motion     Respiratory Effort: Absent Weak cry; hypoventilation Good, crying                         Skin Color:   Heart Rate:   Reflex Irritability:   Muscle Tone:   Respiratory Effort: Total:            1 Minute:    0    2    2    2    2    8        Apgar 1 total from OB History    5 Minute:    1    2    2    2    2    9        Apgar 5 total from OB History               Apgars Assigned By: Lucy Valdez           Resuscitation    Method: Bulb Suction, Stimulation            Measurements       Birth Weight: 3690 g Birth Length: 0.533 m      Head Circumference: 0.36 m             Title       Skin to Skin Initiation Date/Time: 10/11/22 17:20:00 EDT      Skin to Skin With: Mother         Specimen:  None.                  Estimated blood loss: 105 mL     Condition:  infant stable to general nursery and mother stable to maternity     Blood Type and Rh: A NEG     Rubella Immunity Status:   Immune           Infant Feeding:    breast Attending Attestation: I performed the procedure. The patient had an unremarkable Hospital Course and requested a day #1 discharge. Her care was advanced per routine protocol. Her vital signs were stable, she remained afebrile, and her physical exam was unremarkable throughout her hospitalization. She was subsequently discharged home on the first Hospital Day as she was tolerating her diet, ambulating well, voiding without difficulty and had appropriate flatus without urge for a bowel movement.     Recent Results (from the past 72 hour(s))   CBC    Collection Time: 10/11/22  1:11 PM   Result Value Ref Range    WBC 11.2 4.5 - 11.5 E9/L    RBC 4.21 3.50 - 5.50 E12/L    Hemoglobin 13.5 11.5 - 15.5 g/dL    Hematocrit 39.8 34.0 - 48.0 %    MCV 94.5 80.0 - 99.9 fL    MCH 32.1 26.0 - 35.0 pg    MCHC 33.9 32.0 - 34.5 %    RDW 12.1 11.5 - 15.0 fL    Platelets 721 477 - 706 E9/L    MPV 11.1 7.0 - 12.0 fL   TYPE AND SCREEN    Collection Time: 10/11/22  1:11 PM   Result Value Ref Range    ABO/Rh A NEG     Antibody Screen NEG    Urine Drug Screen    Collection Time: 10/11/22  1:23 PM   Result Value Ref Range    Amphetamine Screen, Urine NOT DETECTED Negative <1000 ng/mL    Barbiturate Screen, Ur NOT DETECTED Negative < 200 ng/mL    Benzodiazepine Screen, Urine NOT DETECTED Negative < 200 ng/mL    Cannabinoid Scrn, Ur NOT DETECTED Negative < 50ng/mL    Cocaine Metabolite Screen, Urine NOT DETECTED Negative < 300 ng/mL    Opiate Scrn, Ur NOT DETECTED Negative < 300ng/mL    PCP Screen, Urine NOT DETECTED Negative < 25 ng/mL    Methadone Screen, Urine NOT DETECTED Negative <300 ng/mL    Oxycodone Urine NOT DETECTED Negative <100 ng/mL    FENTANYL SCREEN, URINE NOT DETECTED Negative <1 ng/mL    Drug Screen Comment: see below          Physicians Following Patient During Hospitalization - Reason For Care:  Admitting Physician: Devin Walls  Delivering Physician: Rey Cross Physician(s):    PP#1 Keyshawn  Discharging Physician: Darrin Adkins  Consultant(s): n/a    Discharge Condition:  Level of Function:  Stable  Caregiver Arrangements and Educational Efforts: Written Discharge Instructions were verbally reviewed and given to the Patient. Special Problems: Postpartum preeclampsia precautions reviewed. Plans For Continuing Care:  Unreported Test Results and Intended Follow-Up: 6 week(s) time. Instructions for Physical Activity: No driving for 1 week(s). No sex or tampon use for 6 weeks. No douching. No heavy lifting (greater than baby and carrier) for 6 weeks. Frequent ambulation with stairs - start slowly then increase as tolerated. Return to work in 6 weeks. Showers are fine - avoid bath tubs, hot tubs, swimming. Instructions for Diet: Regular diet  Discharge Medications:  Current Discharge Medication List        START taking these medications    Details   benzocaine-menthol (DERMOPLAST) 20-0.5 % AERO spray Apply topically as needed for Pain . May use hospital sample at home as needed. Refills: 0      docusate sodium (COLACE, DULCOLAX) 100 MG CAPS Take 100 mg by mouth 2 times daily as needed for Constipation      ibuprofen (ADVIL;MOTRIN) 600 MG tablet Take 1 tablet by mouth every 6 hours as needed for Pain  Qty: 60 tablet, Refills: 1      lansinoh lanolin CREA ointment Apply 1 applicator topically as needed for Dry Skin (nipple discomfort)      witch hazel-glycerin (TUCKS) pad Place rectally as needed. Refills: 0           CONTINUE these medications which have NOT CHANGED    Details   Bacillus Coagulans-Inulin (PROBIOTIC) 1-250 BILLION-MG CAPS Take by mouth      Cholecalciferol (VITAMIN D) 50 MCG (2000 UT) CAPS capsule Take by mouth      NONFORMULARY Indications: vitafusion fiber gummies       Prenatal Vit-Fe Fumarate-FA (PRENATAL 19 PO) Take by mouth           Follow-Up Visit Plan: In 6 weeks for final postpartum visit. Disposition: Home.     Time Spent on Discharge:  30 minutes were spent in patient examination, evaluation, counseling as well as medication reconciliation, prescriptions for required medications, discharge plan and follow up.       Mel Vail MD MD,FACOG    10/12/2022 1:00 PM

## 2022-10-12 NOTE — PROGRESS NOTES
CLINICAL PHARMACY NOTE: MEDS TO BEDS    Total # of Prescriptions Filled: 1   The following medications were delivered to the patient:  IBUPROFEN 600 MG    Additional Documentation: